# Patient Record
Sex: FEMALE | Race: WHITE | NOT HISPANIC OR LATINO | Employment: OTHER | URBAN - METROPOLITAN AREA
[De-identification: names, ages, dates, MRNs, and addresses within clinical notes are randomized per-mention and may not be internally consistent; named-entity substitution may affect disease eponyms.]

---

## 2020-11-16 ENCOUNTER — OFFICE VISIT (OUTPATIENT)
Dept: NEUROLOGY | Facility: CLINIC | Age: 65
End: 2020-11-16

## 2020-11-16 VITALS
BODY MASS INDEX: 42.21 KG/M2 | OXYGEN SATURATION: 98 % | HEIGHT: 60 IN | HEART RATE: 121 BPM | DIASTOLIC BLOOD PRESSURE: 86 MMHG | SYSTOLIC BLOOD PRESSURE: 144 MMHG | TEMPERATURE: 97.5 F | WEIGHT: 215 LBS

## 2020-11-16 DIAGNOSIS — G93.31 POSTVIRAL FATIGUE SYNDROME: ICD-10-CM

## 2020-11-16 DIAGNOSIS — G43.C0 PERIODIC HEADACHE SYNDROME, NOT INTRACTABLE: Primary | ICD-10-CM

## 2020-11-16 DIAGNOSIS — G25.0 ESSENTIAL TREMOR: ICD-10-CM

## 2020-11-16 PROCEDURE — 99204 OFFICE O/P NEW MOD 45 MIN: CPT | Performed by: PSYCHIATRY & NEUROLOGY

## 2020-11-16 RX ORDER — ROPINIROLE 0.25 MG/1
TABLET, FILM COATED ORAL
COMMUNITY
End: 2021-10-06 | Stop reason: SDUPTHER

## 2020-11-16 RX ORDER — ATORVASTATIN CALCIUM 40 MG/1
40 TABLET, FILM COATED ORAL DAILY
COMMUNITY

## 2020-11-16 RX ORDER — TRIAMTERENE AND HYDROCHLOROTHIAZIDE 37.5; 25 MG/1; MG/1
1 TABLET ORAL DAILY
COMMUNITY
End: 2022-02-28 | Stop reason: SDUPTHER

## 2020-11-16 RX ORDER — DOCUSATE CALCIUM 240 MG
240 CAPSULE ORAL 2 TIMES DAILY
COMMUNITY
End: 2021-09-13

## 2020-11-16 RX ORDER — ONDANSETRON 4 MG/1
4 TABLET, ORALLY DISINTEGRATING ORAL
COMMUNITY
Start: 2020-10-22 | End: 2023-03-15

## 2020-11-16 RX ORDER — TOPIRAMATE 25 MG/1
TABLET ORAL
Qty: 120 TABLET | Refills: 3 | Status: SHIPPED | OUTPATIENT
Start: 2020-11-16 | End: 2021-07-16

## 2020-11-16 RX ORDER — DULOXETIN HYDROCHLORIDE 60 MG/1
60 CAPSULE, DELAYED RELEASE ORAL DAILY
COMMUNITY
End: 2023-03-15 | Stop reason: SDUPTHER

## 2020-11-16 RX ORDER — MECLIZINE HYDROCHLORIDE 25 MG/1
25 TABLET ORAL 3 TIMES DAILY PRN
COMMUNITY
End: 2023-03-15

## 2020-11-16 RX ORDER — LOSARTAN POTASSIUM 100 MG/1
100 TABLET ORAL DAILY
COMMUNITY

## 2020-11-16 NOTE — PROGRESS NOTES
Subjective   Patient ID: Johnna Gonsalves is a 65 y.o. female     Chief Complaint   Patient presents with   • Tremors        History of Present Illness    65 y.o. female referred by Desiree Barr for tremors.  R> L tremors in hands.  Hard to push touch screen on phone.  Sx started in last 6 months.      Back and neck hurt.   Radiates into left side of head.      HA frequency is more days than not.  Mild to moderate intensity.      Holding a microphone or washing dishes.  Drinking or hold a remote.      Legs and feet feel weak.     Writing is messy.      Reviewed medical records:    PMH of Sjogren's dx 5 years ago, T2DM on insulin    Tremor in extremities treated with beta blocker.     No past medical history on file.  No family history on file.  Social History     Socioeconomic History   • Marital status:      Spouse name: Not on file   • Number of children: Not on file   • Years of education: Not on file   • Highest education level: Not on file       Review of Systems   Constitutional: Negative for activity change, fatigue and unexpected weight change.   HENT: Negative for tinnitus and trouble swallowing.    Eyes: Negative for photophobia and visual disturbance.   Respiratory: Negative for apnea, cough and choking.    Cardiovascular: Negative for leg swelling.   Gastrointestinal: Negative for nausea and vomiting.   Endocrine: Negative for cold intolerance and heat intolerance.   Genitourinary: Negative for difficulty urinating, frequency, menstrual problem and urgency.   Musculoskeletal: Negative for back pain, gait problem, myalgias and neck pain.   Skin: Negative for color change and rash.   Allergic/Immunologic: Negative for immunocompromised state.   Neurological: Positive for tremors. Negative for dizziness, seizures, syncope, facial asymmetry, speech difficulty, weakness, light-headedness, numbness and headaches.   Hematological: Negative for adenopathy. Does not bruise/bleed easily.  "  Psychiatric/Behavioral: Negative for behavioral problems, confusion, decreased concentration, hallucinations and sleep disturbance.       Objective     Vitals:    11/16/20 1321   Pulse: (!) 121   Temp: 97.5 °F (36.4 °C)   SpO2: 98%   Weight: 97.5 kg (215 lb)   Height: 152.4 cm (60\")       Neurologic Exam     Mental Status   Oriented to person, place, and time.   Registration: recalls 3 of 3 objects. Recall at 5 minutes: recalls 3 of 3 objects. Follows 3 step commands.   Attention: normal. Concentration: normal.   Speech: speech is normal   Level of consciousness: alert  Knowledge: good and consistent with education.   Able to name object. Able to read. Able to repeat. Able to write. Normal comprehension.     Cranial Nerves     CN II   Visual fields full to confrontation.   Visual acuity: normal  Right visual field deficit: none  Left visual field deficit: none     CN III, IV, VI   Pupils are equal, round, and reactive to light.  Extraocular motions are normal.   Right pupil: Shape: regular. Reactivity: brisk. Consensual response: intact.   Left pupil: Shape: regular. Reactivity: brisk. Consensual response: intact.   Nystagmus: none   Diplopia: none  Ophthalmoparesis: none  Upgaze: normal  Downgaze: normal  Conjugate gaze: present  Vestibulo-ocular reflex: present    CN V   Facial sensation intact.   Right corneal reflex: normal  Left corneal reflex: normal    CN VII   Right facial weakness: none  Left facial weakness: none    CN VIII   Hearing: intact    CN IX, X   Palate: symmetric  Right gag reflex: normal  Left gag reflex: normal    CN XI   Right sternocleidomastoid strength: normal  Left sternocleidomastoid strength: normal    CN XII   Tongue: not atrophic  Fasciculations: absent  Tongue deviation: none    Motor Exam   Muscle bulk: normal  Overall muscle tone: normal  Right arm tone: normal  Left arm tone: normal  Right leg tone: normal  Left leg tone: normal    Strength   Strength 5/5 throughout.     Sensory " Exam   Light touch normal.   Vibration normal.   Proprioception normal.   Pinprick normal.     Gait, Coordination, and Reflexes     Gait  Gait: normal    Coordination   Romberg: negative  Finger to nose coordination: normal  Heel to shin coordination: normal  Tandem walking coordination: normal    Tremor   Resting tremor: absent  Intention tremor: absent  Action tremor: absent    Reflexes   Reflexes 2+ except as noted.       Physical Exam  Vitals signs and nursing note reviewed.   Constitutional:       Appearance: She is well-developed.   HENT:      Head: Normocephalic and atraumatic.   Eyes:      General: Lids are normal.      Extraocular Movements: EOM normal.      Pupils: Pupils are equal, round, and reactive to light.      Funduscopic exam:     Right eye: No hemorrhage, exudate or papilledema.         Left eye: No hemorrhage, exudate or papilledema.   Neck:      Musculoskeletal: Normal range of motion.      Thyroid: No thyroid mass or thyromegaly.      Vascular: Normal carotid pulses. No carotid bruit.      Trachea: Phonation normal.   Cardiovascular:      Rate and Rhythm: Normal rate and regular rhythm.      Heart sounds: Normal heart sounds.   Pulmonary:      Effort: Pulmonary effort is normal.   Skin:     General: Skin is warm and dry.   Neurological:      Mental Status: She is oriented to person, place, and time.      Coordination: Finger-Nose-Finger Test, Heel to Shin Test and Romberg Test normal.      Gait: Gait is intact. Tandem walk normal.      Deep Tendon Reflexes: Strength normal.   Psychiatric:         Speech: Speech normal.         No results found for any previous visit.         Assessment/Plan     Problem List Items Addressed This Visit        Cardiovascular and Mediastinum    Periodic headache syndrome, not intractable - Primary    Current Assessment & Plan     Headaches are newly identified.  Medication changes per orders.     Start TPM titration              Relevant Medications    DULoxetine  (CYMBALTA) 60 MG capsule    topiramate (Topamax) 25 MG tablet    Other Relevant Orders    CBC & Differential    Comprehensive Metabolic Panel    Vitamin B12 & Folate    TSH       Nervous and Auditory    Essential tremor    Current Assessment & Plan     Labs     Start TPM          Relevant Medications    rOPINIRole (Requip) 0.25 MG tablet    topiramate (Topamax) 25 MG tablet      Other Visit Diagnoses     Postviral fatigue syndrome         Relevant Orders    TSH             No follow-ups on file.

## 2021-01-12 PROBLEM — G43.C0 PERIODIC HEADACHE SYNDROME, NOT INTRACTABLE: Chronic | Status: ACTIVE | Noted: 2020-11-16

## 2021-01-12 PROBLEM — G25.0 ESSENTIAL TREMOR: Chronic | Status: ACTIVE | Noted: 2020-11-16

## 2021-07-16 ENCOUNTER — OFFICE VISIT (OUTPATIENT)
Dept: FAMILY MEDICINE CLINIC | Facility: CLINIC | Age: 66
End: 2021-07-16

## 2021-07-16 VITALS
WEIGHT: 204.2 LBS | OXYGEN SATURATION: 99 % | SYSTOLIC BLOOD PRESSURE: 138 MMHG | HEIGHT: 60 IN | BODY MASS INDEX: 40.09 KG/M2 | DIASTOLIC BLOOD PRESSURE: 78 MMHG | HEART RATE: 83 BPM

## 2021-07-16 DIAGNOSIS — E11.65 UNCONTROLLED TYPE 2 DIABETES MELLITUS WITH HYPERGLYCEMIA (HCC): ICD-10-CM

## 2021-07-16 DIAGNOSIS — M79.7 FIBROMYALGIA: ICD-10-CM

## 2021-07-16 DIAGNOSIS — Z76.89 ENCOUNTER TO ESTABLISH CARE: Primary | ICD-10-CM

## 2021-07-16 DIAGNOSIS — E55.9 VITAMIN D DEFICIENCY: ICD-10-CM

## 2021-07-16 DIAGNOSIS — Z11.59 ENCOUNTER FOR HEPATITIS C SCREENING TEST FOR LOW RISK PATIENT: ICD-10-CM

## 2021-07-16 DIAGNOSIS — G89.4 CHRONIC PAIN SYNDROME: ICD-10-CM

## 2021-07-16 DIAGNOSIS — I25.2 HISTORY OF MI (MYOCARDIAL INFARCTION): ICD-10-CM

## 2021-07-16 DIAGNOSIS — E11.43 TYPE 2 DIABETES MELLITUS WITH DIABETIC AUTONOMIC NEUROPATHY, WITH LONG-TERM CURRENT USE OF INSULIN (HCC): ICD-10-CM

## 2021-07-16 DIAGNOSIS — G25.81 RLS (RESTLESS LEGS SYNDROME): ICD-10-CM

## 2021-07-16 DIAGNOSIS — G43.C0 PERIODIC HEADACHE SYNDROME, NOT INTRACTABLE: Chronic | ICD-10-CM

## 2021-07-16 DIAGNOSIS — Z79.4 TYPE 2 DIABETES MELLITUS WITH DIABETIC AUTONOMIC NEUROPATHY, WITH LONG-TERM CURRENT USE OF INSULIN (HCC): ICD-10-CM

## 2021-07-16 DIAGNOSIS — G25.0 ESSENTIAL TREMOR: Chronic | ICD-10-CM

## 2021-07-16 DIAGNOSIS — I10 ESSENTIAL HYPERTENSION: ICD-10-CM

## 2021-07-16 DIAGNOSIS — E66.9 OBESITY (BMI 30-39.9): ICD-10-CM

## 2021-07-16 DIAGNOSIS — R53.83 OTHER FATIGUE: ICD-10-CM

## 2021-07-16 DIAGNOSIS — E78.2 MIXED HYPERLIPIDEMIA: ICD-10-CM

## 2021-07-16 LAB — HBA1C MFR BLD: 10.8 %

## 2021-07-16 PROCEDURE — 99204 OFFICE O/P NEW MOD 45 MIN: CPT | Performed by: PHYSICIAN ASSISTANT

## 2021-07-16 PROCEDURE — 3046F HEMOGLOBIN A1C LEVEL >9.0%: CPT | Performed by: PHYSICIAN ASSISTANT

## 2021-07-16 PROCEDURE — 83036 HEMOGLOBIN GLYCOSYLATED A1C: CPT | Performed by: PHYSICIAN ASSISTANT

## 2021-07-16 RX ORDER — SALIVA STIMULANT COMB. NO.7
GEL (GRAM) MUCOUS MEMBRANE AS NEEDED
COMMUNITY
End: 2023-02-02

## 2021-07-16 RX ORDER — OXYMETAZOLINE HYDROCHLORIDE 0.05 G/100ML
2 SPRAY NASAL 2 TIMES DAILY
COMMUNITY
End: 2023-02-02

## 2021-07-16 RX ORDER — GLIMEPIRIDE 4 MG/1
TABLET ORAL
COMMUNITY
Start: 2021-07-14 | End: 2022-02-15 | Stop reason: SDUPTHER

## 2021-07-16 RX ORDER — ASPIRIN 81 MG/1
81 TABLET, CHEWABLE ORAL DAILY
COMMUNITY

## 2021-07-16 RX ORDER — INSULIN ASPART 100 [IU]/ML
100 INJECTION, SUSPENSION SUBCUTANEOUS 2 TIMES DAILY WITH MEALS
COMMUNITY
End: 2022-02-07

## 2021-07-16 RX ORDER — BISOPROLOL FUMARATE 10 MG/1
TABLET, FILM COATED ORAL
COMMUNITY
Start: 2021-07-05 | End: 2022-02-28 | Stop reason: SDUPTHER

## 2021-07-16 RX ORDER — EMPAGLIFLOZIN 25 MG/1
TABLET, FILM COATED ORAL
COMMUNITY
Start: 2021-05-10 | End: 2022-02-07 | Stop reason: SDUPTHER

## 2021-07-17 NOTE — PROGRESS NOTES
Chief Complaint   Patient presents with   • Establish Care     Pt is here to Establish care.        HPI     Johnna Gonsalves is a 66 y.o. female who presents to establish care.  Patient was recently established with a primary care provider.  She states that she has not had blood work or annual exam in the last year.  She presents for management of obesity, diabetes type 2, history of MI, hyperlipidemia, hypertension, chronic pain syndrome, fibromyalgia, fatigue, RLS, headache and essential tremor.      Patient is established with cardiology.  On ASA 81 mg daily. Blood pressure is borderline today.  She monitors at home and states this is usually well-controlled.    Patient reports that her recent hemoglobin AIC was 12%.  She refuses to take metformin, sister had adverse effects with medication.  She is on jardiance 25 mg daily, glimepiride 4 mg daily, novolog 70/30 BID.  She is interested in diabetic education.    She is taking ropinirole 0.75 mg nighty for RLS and this works well.  No longer taking topamax for headache as this was not helping.    On duloxetine 60 mg daily for fibromyalgia and chronic pain. She does not feel this is helping her symptoms.  She has ongoing chronic pain throughout her body.  She also reports ongoing fatigue.    Does not want Covid-19 vaccination until it receives full FDA approval.    Chief Complaint   Patient presents with   • Establish Care     Pt is here to Establish care.        Past Medical History:   Diagnosis Date   • Anemia    • Arthritis    • Carpal tunnel syndrome    • Cataract    • Chronic tension headaches    • Diabetes (CMS/HCC)    • Diabetic neuropathy (CMS/HCC)    • Dizziness    • Environmental allergies    • Fibromyalgia    • Fibromyalgia    • Fractures    • Gall stones    • Heart attack (CMS/HCC)    • Hypertension    • Kidney infection    • Migraine headache    • Numbness and tingling    • Ovarian cyst    • Shingles    • Sleep apnea        Past Surgical History:    Procedure Laterality Date   • BIOPSY OF LEG     • CATARACT EXTRACTION     •  SECTION     • CHEST WALL BIOPSY     • CHOLECYSTECTOMY     • HYSTERECTOMY         Family History   Problem Relation Age of Onset   • Diabetes Mother    • Dementia Mother    • Heart disease Mother    • Hypertension Mother    • Hyperlipidemia Mother    • Kidney disease Mother    • Neuropathy Mother    • Obesity Mother    • Thyroid disease Mother    • Heart attack Mother    • Heart disease Father    • Lung cancer Father         smoker   • Heart attack Father    • Cancer Sister         bile duct   • Obesity Sister    • Breast cancer Maternal Aunt    • Lung cancer Maternal Aunt         smoker   • Colon cancer Neg Hx        Social History     Socioeconomic History   • Marital status:      Spouse name: Not on file   • Number of children: Not on file   • Years of education: Not on file   • Highest education level: Not on file   Tobacco Use   • Smoking status: Never Smoker   • Smokeless tobacco: Never Used   Substance and Sexual Activity   • Alcohol use: Never   • Drug use: Never   • Sexual activity: Defer       Allergies   Allergen Reactions   • Sulfa Antibiotics Swelling       ROS    Review of Systems   Constitutional: Positive for fatigue and unexpected weight loss. Negative for activity change, appetite change, chills, diaphoresis, fever and unexpected weight gain.   HENT: Positive for trouble swallowing. Negative for congestion, dental problem, ear pain, hearing loss, nosebleeds, sinus pressure and sore throat.    Eyes: Positive for pain, discharge, redness, itching and visual disturbance. Negative for blurred vision.   Respiratory: Positive for shortness of breath (with exertion). Negative for apnea, cough, chest tightness and wheezing.    Cardiovascular: Positive for leg swelling. Negative for chest pain and palpitations.   Gastrointestinal: Negative for abdominal distention, abdominal pain, anal bleeding, blood in stool,  "constipation, diarrhea, nausea, vomiting, GERD and indigestion.   Endocrine: Positive for heat intolerance and polyphagia. Negative for cold intolerance, polydipsia and polyuria.   Genitourinary: Negative for decreased urine volume, difficulty urinating, dysuria, frequency, hematuria, urgency and urinary incontinence.   Musculoskeletal: Positive for arthralgias, back pain, joint swelling and myalgias. Negative for gait problem and bursitis.   Skin: Negative for dry skin, rash, skin lesions and bruise.        Itching     Neurological: Positive for dizziness, weakness, light-headedness, numbness and headache. Negative for tremors, seizures, syncope, speech difficulty, memory problem and confusion.   Hematological: Bruises/bleeds easily.   Psychiatric/Behavioral: Positive for agitation and depressed mood. Negative for behavioral problems, decreased concentration, hallucinations, sleep disturbance, suicidal ideas and stress. The patient is nervous/anxious.        Vitals:    07/16/21 1015   BP: 138/78   Pulse: 83   SpO2: 99%   Weight: 92.6 kg (204 lb 3.2 oz)   Height: 152.4 cm (60\")   PainSc:   4     Body mass index is 39.88 kg/m².    Current Outpatient Medications on File Prior to Visit   Medication Sig Dispense Refill   • aspirin 81 MG chewable tablet Chew 81 mg Daily.     • atorvastatin (LIPITOR) 40 MG tablet Take 40 mg by mouth Daily.     • bisoprolol (ZEBeta) 10 MG tablet      • Cyanocobalamin (Vitamin B-12) 5000 MCG sublingual tablet Place  under the tongue.     • dry mouth gel (BIOTENE ORALBALANCE) gel Apply  to the mouth or throat As Needed for Dry Mouth.     • DULoxetine (CYMBALTA) 60 MG capsule Take 60 mg by mouth Daily.     • Folic Acid (FOLATE PO) Take  by mouth.     • glimepiride (AMARYL) 4 MG tablet      • insulin aspart prot-insulin aspart (novoLOG 70/30) (70-30) 100 UNIT/ML injection Inject 100 Units under the skin into the appropriate area as directed 2 (Two) Times a Day With Meals.     • Jardiance 25 MG " tablet tablet      • losartan (COZAAR) 100 MG tablet Take 100 mg by mouth Daily.     • meclizine (ANTIVERT) 25 MG tablet Take 25 mg by mouth 3 (Three) Times a Day As Needed for Dizziness.     • ondansetron ODT (ZOFRAN-ODT) 4 MG disintegrating tablet 4 mg.     • oxymetazoline (AFRIN) 0.05 % nasal spray 2 sprays into the nostril(s) as directed by provider 2 (Two) Times a Day.     • rOPINIRole (Requip) 0.25 MG tablet Every Night At Bedtime     • triamterene-hydrochlorothiazide (MAXZIDE-25) 37.5-25 MG per tablet Take 1 tablet by mouth Daily.     • Cholecalciferol (vitamin D3) 125 MCG (5000 UT) capsule capsule Take 5,000 Units by mouth Daily.     • docusate calcium (SURFAK) 240 MG capsule Take 240 mg by mouth 2 (Two) Times a Day.     • [DISCONTINUED] topiramate (Topamax) 25 MG tablet Take one tablet twice a day for one week, then two tablets twice a day 120 tablet 3     No current facility-administered medications on file prior to visit.       Results for orders placed or performed in visit on 07/16/21   POC Glycosylated Hemoglobin (Hb A1C)    Specimen: Blood   Result Value Ref Range    Hemoglobin A1C 10.8 %       PE  Physical Exam  Vitals reviewed.   Constitutional:       General: She is not in acute distress.     Appearance: Normal appearance. She is well-developed. She is obese. She is not ill-appearing or diaphoretic.   HENT:      Head: Normocephalic and atraumatic.   Eyes:      Extraocular Movements: Extraocular movements intact.      Conjunctiva/sclera: Conjunctivae normal.   Cardiovascular:      Rate and Rhythm: Normal rate and regular rhythm.      Heart sounds: Normal heart sounds.   Pulmonary:      Effort: Pulmonary effort is normal.      Breath sounds: Normal breath sounds.   Musculoskeletal:         General: Normal range of motion.      Cervical back: Normal range of motion.      Right lower leg: No edema.      Left lower leg: No edema.   Skin:     General: Skin is warm.      Findings: No erythema or rash.    Neurological:      General: No focal deficit present.      Mental Status: She is alert.   Psychiatric:         Attention and Perception: Attention and perception normal. She is attentive.         Mood and Affect: Mood and affect normal.         Speech: Speech normal.         Behavior: Behavior normal. Behavior is cooperative.         Thought Content: Thought content normal.         Cognition and Memory: Cognition and memory normal.         Judgment: Judgment normal.         A/P    Diagnoses and all orders for this visit:    1. Encounter to establish care (Primary)    2. Obesity (BMI 30-39.9)    3. Uncontrolled type 2 diabetes mellitus with hyperglycemia (CMS/[facility])  4. Type 2 diabetes mellitus with diabetic autonomic neuropathy, with long-term current use of insulin (CMS/[facility])  -     POC Glycosylated Hemoglobin (Hb A1C)  -     Vitamin B12; Future  -     Iron Profile; Future  -     Microalbumin / Creatinine Urine Ratio - Urine, Clean Catch; Future  -     Ambulatory Referral to Diabetic Education  Patient reports recent hemoglobin AIC was 12%.  Today hemoglobin AIC is 10.8%.  She is interested in diabetic education, referral placed.  Does not want to take metformin.  On jardiance, novolog 70/30 and glimiperide.    5. History of MI (myocardial infarction)  On ASA 81 mg daily.    6. Essential hypertension  -     CBC Auto Differential; Future  -     Comprehensive Metabolic Panel; Future  -     TSH Rfx On Abnormal To Free T4; Future  Borderline today at appointment.  Patient monitors at home and it is usually better controlled.  Patient is followed by cardiology.  She is compliant on all medications.    7. Mixed hyperlipidemia  -     Lipid Panel; Future  On atorvastatin 40 mg daily.    8. Chronic pain syndrome  9. Fibromyalgia  On duloxetine 60 mg daily.  Patient does not think this is helpful.    10. Other fatigue  -     Vitamin B12; Future  -     Iron Profile; Future    11. RLS (restless legs syndrome)  On ropinirole  0.75 mg nightly.  This is working well for restless legs.    12. Periodic headache syndrome, not intractable  Ongoing issue.  Failed topamax.    13. Essential tremor  Not on any medication for this.  Is on bisoprolol for HTN.    14. Vitamin D deficiency  -     Vitamin D 25 Hydroxy; Future    15. Encounter for hepatitis C screening test for low risk patient  -     Hepatitis C Antibody; Future    Patient has multiple health issues.  Will order labs and follow-up with medicare wellness.  She denies needing refills on medications today.    Plan of care reviewed with patient at the conclusion of today's visit. Education was provided regarding diagnosis, management and any prescribed or recommended OTC medications.  Patient verbalizes understanding of and agreement with management plan.    Return in about 4 weeks (around 8/13/2021) for Medicare Wellness.     Tracee Gaona PA-C

## 2021-07-28 ENCOUNTER — LAB (OUTPATIENT)
Dept: LAB | Facility: HOSPITAL | Age: 66
End: 2021-07-28

## 2021-07-28 DIAGNOSIS — R53.83 OTHER FATIGUE: ICD-10-CM

## 2021-07-28 DIAGNOSIS — E55.9 VITAMIN D DEFICIENCY: ICD-10-CM

## 2021-07-28 DIAGNOSIS — E11.43 TYPE 2 DIABETES MELLITUS WITH DIABETIC AUTONOMIC NEUROPATHY, WITH LONG-TERM CURRENT USE OF INSULIN (HCC): ICD-10-CM

## 2021-07-28 DIAGNOSIS — Z79.4 TYPE 2 DIABETES MELLITUS WITH DIABETIC AUTONOMIC NEUROPATHY, WITH LONG-TERM CURRENT USE OF INSULIN (HCC): ICD-10-CM

## 2021-07-28 DIAGNOSIS — I10 ESSENTIAL HYPERTENSION: ICD-10-CM

## 2021-07-28 DIAGNOSIS — E78.2 MIXED HYPERLIPIDEMIA: ICD-10-CM

## 2021-07-29 ENCOUNTER — APPOINTMENT (OUTPATIENT)
Dept: DIABETES SERVICES | Facility: HOSPITAL | Age: 66
End: 2021-07-29

## 2021-07-29 LAB
25(OH)D3+25(OH)D2 SERPL-MCNC: 35.8 NG/ML (ref 30–100)
ALBUMIN SERPL-MCNC: 4.3 G/DL (ref 3.5–5.2)
ALBUMIN/CREAT UR: 5 MG/G CREAT (ref 0–29)
ALBUMIN/GLOB SERPL: 1.5 G/DL
ALP SERPL-CCNC: 105 U/L (ref 39–117)
ALT SERPL-CCNC: 19 U/L (ref 1–33)
AST SERPL-CCNC: 21 U/L (ref 1–32)
BASOPHILS # BLD AUTO: 0.05 10*3/MM3 (ref 0–0.2)
BASOPHILS NFR BLD AUTO: 0.6 % (ref 0–1.5)
BILIRUB SERPL-MCNC: 0.9 MG/DL (ref 0–1.2)
BUN SERPL-MCNC: 13 MG/DL (ref 8–23)
BUN/CREAT SERPL: 17.3 (ref 7–25)
CALCIUM SERPL-MCNC: 9.1 MG/DL (ref 8.6–10.5)
CHLORIDE SERPL-SCNC: 104 MMOL/L (ref 98–107)
CHOLEST SERPL-MCNC: 130 MG/DL (ref 0–200)
CO2 SERPL-SCNC: 26.6 MMOL/L (ref 22–29)
CREAT SERPL-MCNC: 0.75 MG/DL (ref 0.57–1)
CREAT UR-MCNC: 91 MG/DL
EOSINOPHIL # BLD AUTO: 0.1 10*3/MM3 (ref 0–0.4)
EOSINOPHIL NFR BLD AUTO: 1.2 % (ref 0.3–6.2)
ERYTHROCYTE [DISTWIDTH] IN BLOOD BY AUTOMATED COUNT: 12.9 % (ref 12.3–15.4)
GLOBULIN SER CALC-MCNC: 2.9 GM/DL
GLUCOSE SERPL-MCNC: 192 MG/DL (ref 65–99)
HCT VFR BLD AUTO: 44.8 % (ref 34–46.6)
HDLC SERPL-MCNC: 44 MG/DL (ref 40–60)
HGB BLD-MCNC: 15.3 G/DL (ref 12–15.9)
IMM GRANULOCYTES # BLD AUTO: 0.01 10*3/MM3 (ref 0–0.05)
IMM GRANULOCYTES NFR BLD AUTO: 0.1 % (ref 0–0.5)
IRON SATN MFR SERPL: 28 % (ref 20–50)
IRON SERPL-MCNC: 96 MCG/DL (ref 37–145)
LDLC SERPL CALC-MCNC: 61 MG/DL (ref 0–100)
LYMPHOCYTES # BLD AUTO: 2.36 10*3/MM3 (ref 0.7–3.1)
LYMPHOCYTES NFR BLD AUTO: 28.1 % (ref 19.6–45.3)
MCH RBC QN AUTO: 31.2 PG (ref 26.6–33)
MCHC RBC AUTO-ENTMCNC: 34.2 G/DL (ref 31.5–35.7)
MCV RBC AUTO: 91.4 FL (ref 79–97)
MICROALBUMIN UR-MCNC: 4.8 UG/ML
MONOCYTES # BLD AUTO: 0.5 10*3/MM3 (ref 0.1–0.9)
MONOCYTES NFR BLD AUTO: 6 % (ref 5–12)
NEUTROPHILS # BLD AUTO: 5.37 10*3/MM3 (ref 1.7–7)
NEUTROPHILS NFR BLD AUTO: 64 % (ref 42.7–76)
NRBC BLD AUTO-RTO: 0 /100 WBC (ref 0–0.2)
PLATELET # BLD AUTO: 263 10*3/MM3 (ref 140–450)
POTASSIUM SERPL-SCNC: 3.6 MMOL/L (ref 3.5–5.2)
PROT SERPL-MCNC: 7.2 G/DL (ref 6–8.5)
RBC # BLD AUTO: 4.9 10*6/MM3 (ref 3.77–5.28)
SODIUM SERPL-SCNC: 141 MMOL/L (ref 136–145)
TIBC SERPL-MCNC: 345 MCG/DL
TRIGL SERPL-MCNC: 146 MG/DL (ref 0–150)
TSH SERPL DL<=0.005 MIU/L-ACNC: 1.62 UIU/ML (ref 0.27–4.2)
UIBC SERPL-MCNC: 249 MCG/DL (ref 112–346)
VIT B12 SERPL-MCNC: 725 PG/ML (ref 211–946)
VLDLC SERPL CALC-MCNC: 25 MG/DL (ref 5–40)
WBC # BLD AUTO: 8.39 10*3/MM3 (ref 3.4–10.8)

## 2021-08-10 ENCOUNTER — HOSPITAL ENCOUNTER (OUTPATIENT)
Dept: DIABETES SERVICES | Facility: HOSPITAL | Age: 66
Setting detail: RECURRING SERIES
Discharge: HOME | End: 2021-08-10

## 2021-08-10 NOTE — CONSULTS
DIABETES EDUCATION CONSULT, 90 minutes diabetes education.  This medical referred consult was provided as a telephone call, tele-health or e-visit, as patient is unable to attend an in-office appointment due to the COVID-19 crisis. Consent for treatment was given verbally.Please see media tab for assessment and notes if you use [employer]. If you are not an [employer] user a copy of patient's assessment and notes will be sent per routine. Thank you.

## 2021-09-07 ENCOUNTER — HOSPITAL ENCOUNTER (OUTPATIENT)
Dept: DIABETES SERVICES | Facility: HOSPITAL | Age: 66
Setting detail: RECURRING SERIES
Discharge: HOME | End: 2021-09-07

## 2021-09-07 ENCOUNTER — DOCUMENTATION (OUTPATIENT)
Dept: DIABETES SERVICES | Facility: HOSPITAL | Age: 66
End: 2021-09-07

## 2021-09-07 NOTE — PLAN OF CARE
DIABETES EDUCATION CONSULT, 30 minutes diabetes education follow up. This medical referred consult was provided as a telephone call, tele-health or e-visit, as patient is unable to attend an in-office appointment due to the COVID-19 crisis. Consent for treatment was given verbally.Please see media tab for assessment and notes if you use [facility]. If you are not an [employer] user a copy of patient's assessment and notes will be sent per routine. Thank you.

## 2021-09-13 ENCOUNTER — OFFICE VISIT (OUTPATIENT)
Dept: FAMILY MEDICINE CLINIC | Facility: CLINIC | Age: 66
End: 2021-09-13

## 2021-09-13 VITALS
WEIGHT: 209.8 LBS | BODY MASS INDEX: 41.19 KG/M2 | SYSTOLIC BLOOD PRESSURE: 138 MMHG | OXYGEN SATURATION: 98 % | DIASTOLIC BLOOD PRESSURE: 76 MMHG | HEART RATE: 84 BPM | HEIGHT: 60 IN

## 2021-09-13 DIAGNOSIS — Z12.11 SCREEN FOR COLON CANCER: ICD-10-CM

## 2021-09-13 DIAGNOSIS — E11.43 TYPE 2 DIABETES MELLITUS WITH DIABETIC AUTONOMIC NEUROPATHY, WITH LONG-TERM CURRENT USE OF INSULIN (HCC): ICD-10-CM

## 2021-09-13 DIAGNOSIS — R45.4 IRRITABILITY: ICD-10-CM

## 2021-09-13 DIAGNOSIS — Z00.00 MEDICARE ANNUAL WELLNESS VISIT, SUBSEQUENT: Primary | ICD-10-CM

## 2021-09-13 DIAGNOSIS — R10.11 RIGHT UPPER QUADRANT PAIN: ICD-10-CM

## 2021-09-13 DIAGNOSIS — I25.2 HISTORY OF MI (MYOCARDIAL INFARCTION): ICD-10-CM

## 2021-09-13 DIAGNOSIS — Z79.4 TYPE 2 DIABETES MELLITUS WITH DIABETIC AUTONOMIC NEUROPATHY, WITH LONG-TERM CURRENT USE OF INSULIN (HCC): ICD-10-CM

## 2021-09-13 DIAGNOSIS — Z12.31 ENCOUNTER FOR SCREENING MAMMOGRAM FOR MALIGNANT NEOPLASM OF BREAST: ICD-10-CM

## 2021-09-13 DIAGNOSIS — M35.01 SJOGREN'S SYNDROME WITH KERATOCONJUNCTIVITIS SICCA (HCC): ICD-10-CM

## 2021-09-13 DIAGNOSIS — I10 ESSENTIAL HYPERTENSION: ICD-10-CM

## 2021-09-13 DIAGNOSIS — Z23 NEED FOR INFLUENZA VACCINATION: ICD-10-CM

## 2021-09-13 DIAGNOSIS — E78.2 MIXED HYPERLIPIDEMIA: ICD-10-CM

## 2021-09-13 DIAGNOSIS — S30.1XXA CONTUSION OF ABDOMINAL WALL, INITIAL ENCOUNTER: ICD-10-CM

## 2021-09-13 DIAGNOSIS — M85.88 OTHER SPECIFIED DISORDERS OF BONE DENSITY AND STRUCTURE, OTHER SITE: ICD-10-CM

## 2021-09-13 PROBLEM — M15.9 OSTEOARTHRITIS OF MULTIPLE JOINTS: Status: ACTIVE | Noted: 2021-09-13

## 2021-09-13 LAB — HBA1C MFR BLD: 9.4 %

## 2021-09-13 PROCEDURE — G0008 ADMIN INFLUENZA VIRUS VAC: HCPCS | Performed by: PHYSICIAN ASSISTANT

## 2021-09-13 PROCEDURE — 90686 IIV4 VACC NO PRSV 0.5 ML IM: CPT | Performed by: PHYSICIAN ASSISTANT

## 2021-09-13 PROCEDURE — G0439 PPPS, SUBSEQ VISIT: HCPCS | Performed by: PHYSICIAN ASSISTANT

## 2021-09-13 RX ORDER — HYDROXYCHLOROQUINE SULFATE 200 MG/1
200 TABLET, FILM COATED ORAL DAILY
COMMUNITY
Start: 2021-07-19 | End: 2023-03-15

## 2021-09-13 RX ORDER — ESCITALOPRAM OXALATE 10 MG/1
10 TABLET ORAL DAILY
Qty: 30 TABLET | Refills: 5 | Status: SHIPPED | OUTPATIENT
Start: 2021-09-13 | End: 2022-02-15 | Stop reason: SDUPTHER

## 2021-09-13 RX ORDER — ISOSORBIDE DINITRATE 30 MG/1
30 TABLET ORAL
COMMUNITY
Start: 2021-07-26 | End: 2023-02-02

## 2021-09-13 NOTE — PROGRESS NOTES
The ABCs of the Annual Wellness Visit  Subsequent Medicare Wellness Visit    Chief Complaint   Patient presents with   • Medicare Wellness-subsequent      Subjective    History of Present Illness:  Johnna Gonsalves is a 66 y.o. female who presents for a Subsequent Medicare Wellness Visit.  Patient presents for hypertension, hyperlipidemia, history of MI, diabetes type 2, Sjogren's syndrome, osteoarthritis and new onset irritability, RUQ pain with bruising.  Patient is compliant on all medications.  Followed by rheumatology, cardiology and ophthalmology.  Due for mammogram, DEXA and colonoscopy.  Trying to walk daily and watching her diet.  Takes ASA daily.  Had first Covid-19 vaccination.    Patient reports new onset irritability.  Reports this is 20 year anniversary of  passing and that has been very hard.  Takes cymbalta 60 mg daily for fibromyalgia.  Not on SSRI, hasn't tried this in the past.    Patient reports right upper quadrant pain that is sharp at times and dull ache otherwise.  Noticed bruising in this area.  Denies any trauma/injury.  Had gallbladder removed years ago.  Sister  from biliary/bile duct carcinoma years after her gallbladder was removed.  Patient reports mild nausea.  No change in stools, vomiting, fever or chills.    The following portions of the patient's history were reviewed and   updated as appropriate: allergies, current medications, past family history, past medical history, past social history, past surgical history and problem list.    Compared to one year ago, the patient feels her physical   health is the same.    Compared to one year ago, the patient feels her mental   health is worse.    Recent Hospitalizations:  This patient has had a [facility] admission record on file within the last 365 days.    Current Medical Providers:  Patient Care Team:  Tracee Gaona PA-C as PCP - General (Physician Assistant)  Danya Kyle MD as Consulting Physician  (Cardiology)  Og Mclean MD as Consulting Physician (Rheumatology)    Outpatient Medications Prior to Visit   Medication Sig Dispense Refill   • aspirin 81 MG chewable tablet Chew 81 mg Daily.     • atorvastatin (LIPITOR) 40 MG tablet Take 40 mg by mouth Daily.     • bisoprolol (ZEBeta) 10 MG tablet      • Cholecalciferol (vitamin D3) 125 MCG (5000 UT) capsule capsule Take 5,000 Units by mouth Daily.     • Cyanocobalamin (Vitamin B-12) 5000 MCG sublingual tablet Place  under the tongue.     • dry mouth gel (BIOTENE ORALBALANCE) gel Apply  to the mouth or throat As Needed for Dry Mouth.     • DULoxetine (CYMBALTA) 60 MG capsule Take 60 mg by mouth Daily.     • Folic Acid (FOLATE PO) Take  by mouth.     • glimepiride (AMARYL) 4 MG tablet      • hydroxychloroquine (PLAQUENIL) 200 MG tablet Take 200 mg by mouth Daily.     • insulin aspart prot-insulin aspart (novoLOG 70/30) (70-30) 100 UNIT/ML injection Inject 100 Units under the skin into the appropriate area as directed 2 (Two) Times a Day With Meals.     • isosorbide dinitrate (ISORDIL) 30 MG tablet      • Jardiance 25 MG tablet tablet      • losartan (COZAAR) 100 MG tablet Take 100 mg by mouth Daily.     • meclizine (ANTIVERT) 25 MG tablet Take 25 mg by mouth 3 (Three) Times a Day As Needed for Dizziness.     • ondansetron ODT (ZOFRAN-ODT) 4 MG disintegrating tablet 4 mg.     • oxymetazoline (AFRIN) 0.05 % nasal spray 2 sprays into the nostril(s) as directed by provider 2 (Two) Times a Day.     • rOPINIRole (Requip) 0.25 MG tablet Every Night At Bedtime     • triamterene-hydrochlorothiazide (MAXZIDE-25) 37.5-25 MG per tablet Take 1 tablet by mouth Daily.     • docusate calcium (SURFAK) 240 MG capsule Take 240 mg by mouth 2 (Two) Times a Day.       No facility-administered medications prior to visit.       No opioid medication identified on active medication list. I have reviewed chart for other potential  high risk medication/s and harmful drug interactions in  the elderly.          Aspirin is on active medication list. Aspirin use is indicated based on review of current medical condition/s. Pros and cons of this therapy have been discussed today. Benefits of this medication outweigh potential harm.  Patient has been encouraged to continue taking this medication.  .      Patient Active Problem List   Diagnosis   • Periodic headache syndrome, not intractable   • Essential tremor   • Fibromyalgia   • Chronic pain syndrome   • Type 2 diabetes mellitus with diabetic autonomic neuropathy, with long-term current use of insulin (CMS/[facility])   • RLS (restless legs syndrome)   • Essential hypertension   • History of MI (myocardial infarction)   • Mixed hyperlipidemia   • Obesity (BMI 30-39.9)   • Uncontrolled type 2 diabetes mellitus with hyperglycemia (CMS/[facility])   • Sjogren's syndrome with keratoconjunctivitis sicca (CMS/[facility])   • Osteoarthritis of multiple joints     Advance Care Planning  Advance Directive is not on file.  ACP discussion was held with the patient during this visit. Patient has an advance directive (not in EMR), copy requested.    Review of Systems   Constitutional: Negative for chills, fatigue and fever.   HENT: Negative for congestion, rhinorrhea, sinus pressure and sore throat.    Eyes: Positive for visual disturbance.   Respiratory: Negative for cough, shortness of breath and wheezing.    Cardiovascular: Negative for chest pain, palpitations and leg swelling.   Gastrointestinal: Positive for abdominal distention, abdominal pain and nausea. Negative for blood in stool, constipation, diarrhea and vomiting.   Endocrine: Negative for polydipsia, polyphagia and polyuria.   Genitourinary: Negative for dysuria, flank pain and hematuria.   Musculoskeletal: Positive for arthralgias and myalgias. Negative for gait problem.   Neurological: Negative for dizziness.   Hematological: Bruises/bleeds easily.   Psychiatric/Behavioral: Positive for agitation. Negative for dysphoric  "mood, sleep disturbance and suicidal ideas.        Objective    Vitals:    09/13/21 1002   BP: 138/76   Pulse: 84   SpO2: 98%   Weight: 95.2 kg (209 lb 12.8 oz)   Height: 152.4 cm (60\")     BMI Readings from Last 1 Encounters:   09/13/21 40.97 kg/m²   BMI is above normal parameters. Recommendations include: exercise counseling and nutrition counseling    Does the patient have evidence of cognitive impairment? No    Physical Exam  Vitals reviewed.   Constitutional:       General: She is not in acute distress.     Appearance: Normal appearance. She is well-developed. She is obese. She is not ill-appearing or diaphoretic.   HENT:      Head: Normocephalic and atraumatic.      Right Ear: Hearing normal.      Left Ear: Hearing normal.   Eyes:      General: Lids are normal.      Extraocular Movements: Extraocular movements intact.      Conjunctiva/sclera: Conjunctivae normal.   Neck:      Thyroid: No thyroid mass or thyromegaly.      Trachea: Trachea and phonation normal.   Cardiovascular:      Rate and Rhythm: Normal rate and regular rhythm.      Heart sounds: Normal heart sounds.   Pulmonary:      Effort: Pulmonary effort is normal.      Breath sounds: Normal breath sounds.   Abdominal:      General: Abdomen is protuberant. There is no distension.      Palpations: Abdomen is soft. Abdomen is not rigid.      Tenderness: There is abdominal tenderness in the right upper quadrant and epigastric area. There is no right CVA tenderness, left CVA tenderness or guarding.       Musculoskeletal:         General: Normal range of motion.      Cervical back: Normal range of motion.      Right lower leg: No edema.      Left lower leg: No edema.   Lymphadenopathy:      Cervical: No cervical adenopathy.      Right cervical: No superficial cervical adenopathy.     Left cervical: No superficial cervical adenopathy.   Skin:     General: Skin is warm.      Findings: No erythema or rash.      Nails: There is no clubbing.   Neurological:      " Mental Status: She is alert and oriented to person, place, and time.      Coordination: Coordination normal.      Gait: Gait normal.      Deep Tendon Reflexes: Reflexes are normal and symmetric.      Comments: CN grossly intact   Psychiatric:         Attention and Perception: Attention and perception normal. She is attentive.         Mood and Affect: Mood and affect normal.         Speech: Speech normal.         Behavior: Behavior normal. Behavior is cooperative.         Thought Content: Thought content normal.         Cognition and Memory: Cognition and memory normal.         Judgment: Judgment normal.       Lab Results   Component Value Date     (H) 07/28/2021    CHLPL 130 07/28/2021    TRIG 146 07/28/2021    HDL 44 07/28/2021    LDL 61 07/28/2021    VLDL 25 07/28/2021    HGBA1C 9.4 09/13/2021            HEALTH RISK ASSESSMENT    Smoking Status:  Social History     Tobacco Use   Smoking Status Never Smoker   Smokeless Tobacco Never Used     Alcohol Consumption:  Social History     Substance and Sexual Activity   Alcohol Use Never     Fall Risk Screen:    STEADI Fall Risk Assessment has not been completed.    Depression Screening:  PHQ-2/PHQ-9 Depression Screening 9/13/2021   Little interest or pleasure in doing things 0   Feeling down, depressed, or hopeless 0   Total Score 0       Health Habits and Functional and Cognitive Screening:  Functional & Cognitive Status 9/13/2021   Do you have difficulty preparing food and eating? No   Do you have difficulty bathing yourself, getting dressed or grooming yourself? No   Do you have difficulty using the toilet? No   Do you have difficulty moving around from place to place? No   Do you have trouble with steps or getting out of a bed or a chair? No   Current Diet Well Balanced Diet   Dental Exam Not up to date   Eye Exam Up to date   Exercise (times per week) 1 times per week   Current Exercises Include Walking   Do you need help using the phone?  No   Are you deaf  or do you have serious difficulty hearing?  No   Do you need help with transportation? No   Do you need help shopping? No   Do you need help preparing meals?  No   Do you need help with housework?  No   Do you need help with laundry? No   Do you need help taking your medications? No   Do you need help managing money? No   Do you ever drive or ride in a car without wearing a seat belt? No   Have you felt unusual stress, anger or loneliness in the last month? No   Who do you live with? Other   If you need help, do you have trouble finding someone available to you? No   Have you been bothered in the last four weeks by sexual problems? No   Do you have difficulty concentrating, remembering or making decisions? Yes       Age-appropriate Screening Schedule:  Refer to the list below for future screening recommendations based on patient's age, sex and/or medical conditions. Orders for these recommended tests are listed in the plan section. The patient has been provided with a written plan.    Health Maintenance   Topic Date Due   • MAMMOGRAM  Never done   • TDAP/TD VACCINES (1 - Tdap) Never done   • ZOSTER VACCINE (1 of 2) Never done   • DIABETIC FOOT EXAM  Never done   • DIABETIC EYE EXAM  Never done   • INFLUENZA VACCINE  10/01/2021   • HEMOGLOBIN A1C  03/13/2022   • LIPID PANEL  07/28/2022   • URINE MICROALBUMIN  07/28/2022   • PAP SMEAR  Discontinued              Assessment/Plan   CMS Preventative Services Quick Reference  Risk Factors Identified During Encounter  Cardiovascular Disease  Chronic Pain   Immunizations Discussed/Encouraged (specific Immunizations; Influenza and COVID19  Obesity/Overweight   The above risks/problems have been discussed with the patient.  Follow up actions/plans if indicated are seen below in the Assessment/Plan Section.  Pertinent information has been shared with the patient in the After Visit Summary.    Diagnoses and all orders for this visit:    1. Medicare annual wellness visit,  subsequent (Primary)    2. Type 2 diabetes mellitus with diabetic autonomic neuropathy, with long-term current use of insulin (CMS/[facility])  -     POC Glycosylated Hemoglobin (Hb A1C)  Hemoglobin AIC was 10.8%, today it is 9.4%.  Patient is compliant on medications.  Trying to watch diet and eat healthier.  On statin and ARB.    3. Essential hypertension  Borderline.  Encouraged patient to monitor at home.  No headache or dizziness.  Compliant on medications.    4. Mixed hyperlipidemia  On atorvasatin 40 mg daily.    5. History of MI (myocardial infarction)  On ASA daily.  Followed by cardiology.    6. Sjogren's syndrome with keratoconjunctivitis sicca (CMS/HCC)  Established with rheumatology.    7. Irritability  -     escitalopram (Lexapro) 10 MG tablet; Take 1 tablet by mouth Daily.  Dispense: 30 tablet; Refill: 5  Trial of lexapro 10 mg daily.    8. Contusion of abdominal wall, initial encounter  -     CT Abdomen Pelvis With Contrast; Future    9. Right upper quadrant pain  -     CT Abdomen Pelvis With Contrast; Future  Pain and bruising along right upper quadrant.  No other symptoms.  No known trauma/injury.  Cholecystectomy.  Sister  of bile cancer.  Will order CT abdo/pelvis.    10. Other specified disorders of bone density and structure, other site  -     DEXA Bone Density Axial    11. Screen for colon cancer  -     Ambulatory Referral For Screening Colonoscopy    12. Encounter for screening mammogram for malignant neoplasm of breast  -     Mammo Screening Digital Tomosynthesis Bilateral With CAD; Future    13. Need for influenza vaccination  -     FluLaval >6 Months ([id number])        Follow Up:   Return in about 3 months (around 2021) for Recheck, DM.     An After Visit Summary and PPPS were made available to the patient.

## 2021-09-13 NOTE — PATIENT INSTRUCTIONS
Diabetes Basics       Diabetes (diabetes mellitus) is a long-term (chronic) disease. It occurs when the body does not properly use sugar (glucose) that is released from food after you eat.  Diabetes may be caused by one or both of these problems:  · Your pancreas does not make enough of a hormone called insulin.  · Your body does not react in a normal way to insulin that it makes.  Insulin lets sugars (glucose) go into cells in your body. This gives you energy. If you have diabetes, sugars cannot get into cells. This causes high blood sugar (hyperglycemia).  Follow these instructions at home:  How is diabetes treated?  You may need to take insulin or other diabetes medicines daily to keep your blood sugar in balance. Take your diabetes medicines every day as told by your doctor. List your diabetes medicines here:  Diabetes medicines  1. Name of medicine: ______________________________  ? Amount (dose): _______________ Time (a.m./p.m.): _______________ Notes: ___________________________________  2. Name of medicine: ______________________________  ? Amount (dose): _______________ Time (a.m./p.m.): _______________ Notes: ___________________________________  3. Name of medicine: ______________________________  ? Amount (dose): _______________ Time (a.m./p.m.): _______________ Notes: ___________________________________  If you use insulin, you will learn how to give yourself insulin by injection. You may need to adjust the amount based on the food that you eat. List the types of insulin you use here:  Insulin  1. Insulin type: ______________________________  ? Amount (dose): _______________ Time (a.m./p.m.): _______________ Notes: ___________________________________  2. Insulin type: ______________________________  ? Amount (dose): _______________ Time (a.m./p.m.): _______________ Notes: ___________________________________  3. Insulin type: ______________________________  ? Amount (dose): _______________ Time  (a.m./p.m.): _______________ Notes: ___________________________________  4. Insulin type: ______________________________  ? Amount (dose): _______________ Time (a.m./p.m.): _______________ Notes: ___________________________________  5. Insulin type: ______________________________  ? Amount (dose): _______________ Time (a.m./p.m.): _______________ Notes: ___________________________________  How do I manage my blood sugar?       Check your blood sugar levels using a blood glucose monitor as directed by your doctor.  Your doctor will set treatment goals for you. Generally, you should have these blood sugar levels:  · Before meals (preprandial):  mg/dL (4.4-7.2 mmol/L).  · After meals (postprandial): below 180 mg/dL (10 mmol/L).  · A1c level: less than 7%.  Write down the times that you will check your blood sugar levels:  Blood sugar checks  · Time: _______________ Notes: ___________________________________  · Time: _______________ Notes: ___________________________________  · Time: _______________ Notes: ___________________________________  · Time: _______________ Notes: ___________________________________  · Time: _______________ Notes: ___________________________________  · Time: _______________ Notes: ___________________________________     What do I need to know about low blood sugar?  Low blood sugar is called hypoglycemia. This is when blood sugar is at or below 70 mg/dL (3.9 mmol/L). Symptoms may include:  1. Feeling:  ? Hungry.  ? Worried or nervous (anxious).  ? Sweaty and clammy.  ? Confused.  ? Dizzy.  ? Sleepy.  ? Sick to your stomach (nauseous).  2. Having:  ? A fast heartbeat.  ? A headache.  ? A change in your vision.  ? Tingling or no feeling (numbness) around the mouth, lips, or tongue.  ? Jerky movements that you cannot control (seizure).  3. Having trouble with:  ? Moving (coordination).  ? Sleeping.  ? Passing out (fainting).  ? Getting upset easily (irritability).  Treating low blood  sugar  To treat low blood sugar, eat or drink something sugary right away. If you can think clearly and swallow safely, follow the 15:15 rule:  1. Take 15 grams of a fast-acting carb (carbohydrate). Talk with your doctor about how much you should take.  2. Some fast-acting carbs are:  ? Sugar tablets (glucose pills). Take 3-4 glucose pills.  ? 6-8 pieces of hard candy.  ? 4-6 oz (120-150 mL) of fruit juice.  ? 4-6 oz (120-150 mL) of regular (not diet) soda.  ? 1 Tbsp (15 mL) honey or sugar.  3. Check your blood sugar 15 minutes after you take the carb.  4. If your blood sugar is still at or below 70 mg/dL (3.9 mmol/L), take 15 grams of a carb again.  5. If your blood sugar does not go above 70 mg/dL (3.9 mmol/L) after 3 tries, get help right away.  6. After your blood sugar goes back to normal, eat a meal or a snack within 1 hour.  Treating very low blood sugar  If your blood sugar is at or below 54 mg/dL (3 mmol/L), you have very low blood sugar (severe hypoglycemia). This is an emergency. Do not wait to see if the symptoms will go away. Get medical help right away. Call your local emergency services (911 in the [place] Do not drive yourself to the hospital.  Questions to ask your health care provider  · Do I need to meet with a diabetes educator?  · What equipment will I need to care for myself at home?  · What diabetes medicines do I need? When should I take them?  · How often do I need to check my blood sugar?  · What number can I call if I have questions?  · When is my next doctor's visit?  · Where can I find a support group for people with diabetes?  Where to find more information  · American Diabetes Association: www.diabetes.org  · American Association of Diabetes Educators: www.diabeteseducator.org/patient-resources  Contact a doctor if:  1. Your blood sugar is at or above 240 mg/dL (13.3 mmol/L) for 2 days in a row.  2. You have been sick or have had a fever for 2 days or more, and you are not getting  better.  3. You have any of these problems for more than 6 hours:  ? You cannot eat or drink.  ? You feel sick to your stomach (nauseous).  ? You throw up (vomit).  ? You have watery poop (diarrhea).  Get help right away if:  1. Your blood sugar is lower than 54 mg/dL (3 mmol/L).  2. You get confused.  3. You have trouble:  ? Thinking clearly.  ? Breathing.  Summary  · Diabetes (diabetes mellitus) is a long-term (chronic) disease. It occurs when the body does not properly use sugar (glucose) that is released from food after digestion.  · Take insulin and diabetes medicines as told.  · Check your blood sugar every day, as often as told.  · Keep all follow-up visits as told by your doctor. This is important.  This information is not intended to replace advice given to you by your health care provider. Make sure you discuss any questions you have with your health care provider.  Document Revised: 09/09/2020 Document Reviewed: 03/22/2019  [employer] Patient Education © 2021 [employer]

## 2021-09-14 ENCOUNTER — TELEPHONE (OUTPATIENT)
Dept: FAMILY MEDICINE CLINIC | Facility: CLINIC | Age: 66
End: 2021-09-14

## 2021-09-14 DIAGNOSIS — Z79.4 TYPE 2 DIABETES MELLITUS WITH DIABETIC AUTONOMIC NEUROPATHY, WITH LONG-TERM CURRENT USE OF INSULIN (HCC): Primary | ICD-10-CM

## 2021-09-14 DIAGNOSIS — E11.43 TYPE 2 DIABETES MELLITUS WITH DIABETIC AUTONOMIC NEUROPATHY, WITH LONG-TERM CURRENT USE OF INSULIN (HCC): Primary | ICD-10-CM

## 2021-09-14 NOTE — TELEPHONE ENCOUNTER
If the patient will run out of medication over the weekend add that information to the additional details line. Send this encounter to the clinical pool.    Caller: Johnna Gonsalves    Relationship: Self    Best call back number: [phone]  Medication needed:     GLUCOSE MONITOR/PATCH THAT ATTACHES TO ARM AND READINGS APPEAR ON PHONE DEVICE    When do you need the refill by: 9/14/21  What additional details did the patient provide when requesting the medication: PATIENT STATED PHARMACY HAS NOT RECEIVED ORDER FOR GLUCOSE MONITOR THAT DOCTOR SHIN AND PATIENT DISCUSSED AT APPOINTMENT.  MONITOR THAT ATTACHES TO PATIENTS ARM AND CAN BE READ ON PHONE DEVICE  Does the patient have less than a 3 day supply:  [x] Yes  [] No    What is the patient's preferred pharmacy: [facility] [address] [place] [address] [phone] [facility] [phone] FX          “Thank you for sharing this information with me. I will send a message to the clinical team. Please allow 48 hours for the clinical staff to follow up on this request.”

## 2021-09-15 RX ORDER — FLASH GLUCOSE SCANNING READER
1 EACH MISCELLANEOUS ONCE
Qty: 1 EACH | Refills: 0 | Status: SHIPPED | OUTPATIENT
Start: 2021-09-15 | End: 2021-09-15

## 2021-09-15 RX ORDER — FLASH GLUCOSE SENSOR
KIT MISCELLANEOUS
Qty: 3 EACH | Refills: 11 | Status: SHIPPED | OUTPATIENT
Start: 2021-09-15 | End: 2022-02-15 | Stop reason: SDUPTHER

## 2021-09-15 NOTE — TELEPHONE ENCOUNTER
Called and spoke with pt. Informed pt glucose monitor and patches have been sent to pts pharmacy as requested and will be available for  shortly. Pt verbalized understanding and has no further questions at this time.

## 2021-09-17 DIAGNOSIS — Z12.11 ENCOUNTER FOR SCREENING COLONOSCOPY: Primary | ICD-10-CM

## 2021-10-06 ENCOUNTER — TELEPHONE (OUTPATIENT)
Dept: FAMILY MEDICINE CLINIC | Facility: CLINIC | Age: 66
End: 2021-10-06

## 2021-10-06 DIAGNOSIS — G25.81 RLS (RESTLESS LEGS SYNDROME): Primary | ICD-10-CM

## 2021-10-06 RX ORDER — ROPINIROLE 0.25 MG/1
0.25 TABLET, FILM COATED ORAL NIGHTLY
Qty: 30 TABLET | Refills: 1 | Status: SHIPPED | OUTPATIENT
Start: 2021-10-06 | End: 2021-10-28 | Stop reason: SDUPTHER

## 2021-10-06 NOTE — TELEPHONE ENCOUNTER
Caller: Johnna Gonsalves    Relationship: Self    Best call back number: [phone]    What medication are you requesting: rOPINIRole (Requip) 0.25 MG tablet    What are your current symptoms: RESTLESS LEGS     How long have you been experiencing symptoms: 4-5 YEARS     Have you had these symptoms before:    [x] Yes  [] No    Have you been treated for these symptoms before:   [x] Yes  [] No    If a prescription is needed, what is your preferred pharmacy and phone number:  [facility] IN [place]     Additional notes:

## 2021-10-07 ENCOUNTER — OUTSIDE FACILITY SERVICE (OUTPATIENT)
Dept: GASTROENTEROLOGY | Facility: CLINIC | Age: 66
End: 2021-10-07

## 2021-10-07 PROCEDURE — 45380 COLONOSCOPY AND BIOPSY: CPT | Performed by: INTERNAL MEDICINE

## 2021-10-07 PROCEDURE — 45385 COLONOSCOPY W/LESION REMOVAL: CPT | Performed by: INTERNAL MEDICINE

## 2021-10-07 PROCEDURE — 88305 TISSUE EXAM BY PATHOLOGIST: CPT | Performed by: INTERNAL MEDICINE

## 2021-10-08 ENCOUNTER — LAB REQUISITION (OUTPATIENT)
Dept: LAB | Facility: HOSPITAL | Age: 66
End: 2021-10-08

## 2021-10-08 DIAGNOSIS — K64.8 OTHER HEMORRHOIDS: ICD-10-CM

## 2021-10-08 DIAGNOSIS — Z12.11 ENCOUNTER FOR SCREENING FOR MALIGNANT NEOPLASM OF COLON: ICD-10-CM

## 2021-10-08 DIAGNOSIS — K63.5 POLYP OF COLON: ICD-10-CM

## 2021-10-11 LAB
CYTO UR: NORMAL
LAB AP CASE REPORT: NORMAL
LAB AP CLINICAL INFORMATION: NORMAL
PATH REPORT.FINAL DX SPEC: NORMAL
PATH REPORT.GROSS SPEC: NORMAL

## 2021-10-27 ENCOUNTER — HOSPITAL ENCOUNTER (OUTPATIENT)
Dept: CT IMAGING | Facility: HOSPITAL | Age: 66
Discharge: HOME | End: 2021-10-27

## 2021-10-27 ENCOUNTER — APPOINTMENT (OUTPATIENT)
Dept: OTHER | Facility: HOSPITAL | Age: 66
End: 2021-10-27

## 2021-10-27 ENCOUNTER — HOSPITAL ENCOUNTER (OUTPATIENT)
Dept: MAMMOGRAPHY | Facility: HOSPITAL | Age: 66
Discharge: HOME | End: 2021-10-27

## 2021-10-27 DIAGNOSIS — Z12.31 ENCOUNTER FOR SCREENING MAMMOGRAM FOR MALIGNANT NEOPLASM OF BREAST: ICD-10-CM

## 2021-10-27 DIAGNOSIS — R10.11 RIGHT UPPER QUADRANT PAIN: ICD-10-CM

## 2021-10-27 DIAGNOSIS — S30.1XXA CONTUSION OF ABDOMINAL WALL, INITIAL ENCOUNTER: ICD-10-CM

## 2021-10-27 PROCEDURE — 25010000002 IOPAMIDOL 61 % SOLUTION: Performed by: PHYSICIAN ASSISTANT

## 2021-10-27 PROCEDURE — 82565 ASSAY OF CREATININE: CPT

## 2021-10-27 PROCEDURE — 74177 CT ABD & PELVIS W/CONTRAST: CPT

## 2021-10-27 PROCEDURE — 77067 SCR MAMMO BI INCL CAD: CPT

## 2021-10-27 PROCEDURE — 77063 BREAST TOMOSYNTHESIS BI: CPT

## 2021-10-27 PROCEDURE — 77063 BREAST TOMOSYNTHESIS BI: CPT | Performed by: RADIOLOGY

## 2021-10-27 PROCEDURE — 77067 SCR MAMMO BI INCL CAD: CPT | Performed by: RADIOLOGY

## 2021-10-27 RX ADMIN — IOPAMIDOL 95 ML: 612 INJECTION, SOLUTION INTRAVENOUS at 11:20

## 2021-10-28 ENCOUNTER — TELEPHONE (OUTPATIENT)
Dept: FAMILY MEDICINE CLINIC | Facility: CLINIC | Age: 66
End: 2021-10-28

## 2021-10-28 DIAGNOSIS — R14.1 GAS PAIN: ICD-10-CM

## 2021-10-28 DIAGNOSIS — G25.81 RLS (RESTLESS LEGS SYNDROME): ICD-10-CM

## 2021-10-28 DIAGNOSIS — R10.13 EPIGASTRIC PAIN: Primary | ICD-10-CM

## 2021-10-28 RX ORDER — ROPINIROLE 0.5 MG/1
0.5 TABLET, FILM COATED ORAL NIGHTLY
Qty: 90 TABLET | Refills: 1 | Status: SHIPPED | OUTPATIENT
Start: 2021-10-28 | End: 2022-02-15 | Stop reason: SDUPTHER

## 2021-10-28 RX ORDER — SIMETHICONE 80 MG
80 TABLET,CHEWABLE ORAL EVERY 6 HOURS PRN
Qty: 90 TABLET | Refills: 1 | Status: SHIPPED | OUTPATIENT
Start: 2021-10-28 | End: 2022-02-15 | Stop reason: SDUPTHER

## 2021-10-28 RX ORDER — OMEPRAZOLE 20 MG/1
20 CAPSULE, DELAYED RELEASE ORAL
Qty: 90 CAPSULE | Refills: 1 | Status: SHIPPED | OUTPATIENT
Start: 2021-10-28 | End: 2022-02-15 | Stop reason: SDUPTHER

## 2021-10-28 NOTE — TELEPHONE ENCOUNTER
Spoke with patient regarding her symptoms.  Will trial omeprazole and simethicone.  She will call if pain persists.

## 2021-10-29 LAB — CREAT BLDA-MCNC: 0.8 MG/DL (ref 0.6–1.3)

## 2021-12-13 ENCOUNTER — OFFICE VISIT (OUTPATIENT)
Dept: FAMILY MEDICINE CLINIC | Facility: CLINIC | Age: 66
End: 2021-12-13

## 2021-12-13 ENCOUNTER — LAB (OUTPATIENT)
Dept: LAB | Facility: HOSPITAL | Age: 66
End: 2021-12-13

## 2021-12-13 VITALS
TEMPERATURE: 97.7 F | HEART RATE: 101 BPM | DIASTOLIC BLOOD PRESSURE: 100 MMHG | WEIGHT: 209.6 LBS | OXYGEN SATURATION: 98 % | SYSTOLIC BLOOD PRESSURE: 160 MMHG | BODY MASS INDEX: 41.15 KG/M2 | HEIGHT: 60 IN

## 2021-12-13 DIAGNOSIS — E11.43 TYPE 2 DIABETES MELLITUS WITH DIABETIC AUTONOMIC NEUROPATHY, WITH LONG-TERM CURRENT USE OF INSULIN (HCC): ICD-10-CM

## 2021-12-13 DIAGNOSIS — E11.43 TYPE 2 DIABETES MELLITUS WITH DIABETIC AUTONOMIC NEUROPATHY, WITH LONG-TERM CURRENT USE OF INSULIN (HCC): Primary | ICD-10-CM

## 2021-12-13 DIAGNOSIS — Z91.14 NONCOMPLIANCE WITH MEDICATIONS: ICD-10-CM

## 2021-12-13 DIAGNOSIS — Z79.4 TYPE 2 DIABETES MELLITUS WITH DIABETIC AUTONOMIC NEUROPATHY, WITH LONG-TERM CURRENT USE OF INSULIN (HCC): Primary | ICD-10-CM

## 2021-12-13 DIAGNOSIS — H61.22 IMPACTED CERUMEN OF LEFT EAR: ICD-10-CM

## 2021-12-13 DIAGNOSIS — I10 ESSENTIAL HYPERTENSION: ICD-10-CM

## 2021-12-13 DIAGNOSIS — Z79.4 TYPE 2 DIABETES MELLITUS WITH DIABETIC AUTONOMIC NEUROPATHY, WITH LONG-TERM CURRENT USE OF INSULIN (HCC): ICD-10-CM

## 2021-12-13 DIAGNOSIS — R13.10 DYSPHAGIA, UNSPECIFIED TYPE: ICD-10-CM

## 2021-12-13 DIAGNOSIS — J01.10 ACUTE NON-RECURRENT FRONTAL SINUSITIS: ICD-10-CM

## 2021-12-13 PROBLEM — Z91.148 NONCOMPLIANCE WITH MEDICATIONS: Status: ACTIVE | Noted: 2021-12-13

## 2021-12-13 PROCEDURE — 99214 OFFICE O/P EST MOD 30 MIN: CPT | Performed by: PHYSICIAN ASSISTANT

## 2021-12-13 PROCEDURE — 69209 REMOVE IMPACTED EAR WAX UNI: CPT | Performed by: PHYSICIAN ASSISTANT

## 2021-12-13 RX ORDER — AZITHROMYCIN 250 MG/1
TABLET, FILM COATED ORAL
Qty: 6 TABLET | Refills: 0 | Status: SHIPPED | OUTPATIENT
Start: 2021-12-13 | End: 2022-02-07

## 2021-12-13 NOTE — PROGRESS NOTES
Chief Complaint   Patient presents with   • Diabetes   • Hypertension     Patient forgot to take her medicine       HPI     Johnna Gonsalves is a pleasant 66 y.o. female who is here for routine follow-up of diabetes type 2 and hypertension.  Patient admits that she is not regularly taking her medications.  She did not have her blood pressure medication today.  She is not taking her diabetes medicine daily either.    She reports difficulty swallowing at times and feeling like food or medication is getting stuck in her throat.  She has not had an EGD.    Her main concern today is facial pain, headache and ear congestion that she has had for several weeks.  She reports that the pain is not improving but getting worse.  She denies cough, fever or shortness of breath.  She is allergic to sulfa.    Past Medical History:   Diagnosis Date   • Anemia    • Arthritis    • Carpal tunnel syndrome    • Cataract    • Chronic tension headaches    • Diabetes (HCC)    • Diabetic neuropathy (HCC)    • Dizziness    • Environmental allergies    • Fibromyalgia    • Fibromyalgia    • Fractures    • Gall stones    • Heart attack (HCC)    • Hypertension    • Kidney infection    • Migraine headache    • Numbness and tingling    • Ovarian cyst    • Shingles    • Sleep apnea        Past Surgical History:   Procedure Laterality Date   • BIOPSY OF LEG     • CATARACT EXTRACTION     •  SECTION     • CHEST WALL BIOPSY     • CHOLECYSTECTOMY     • HYSTERECTOMY     • OOPHORECTOMY         Family History   Problem Relation Age of Onset   • Diabetes Mother    • Dementia Mother    • Heart disease Mother    • Hypertension Mother    • Hyperlipidemia Mother    • Kidney disease Mother    • Neuropathy Mother    • Obesity Mother    • Thyroid disease Mother    • Heart attack Mother    • Heart disease Father    • Lung cancer Father         smoker   • Heart attack Father    • Cancer Sister         bile duct   • Obesity Sister    • Breast cancer  "Maternal Aunt    • Lung cancer Maternal Aunt         smoker   • Colon cancer Neg Hx    • Ovarian cancer Neg Hx        Social History     Socioeconomic History   • Marital status:    Tobacco Use   • Smoking status: Never Smoker   • Smokeless tobacco: Never Used   Vaping Use   • Vaping Use: Never used   Substance and Sexual Activity   • Alcohol use: Never   • Drug use: Never   • Sexual activity: Defer       Allergies   Allergen Reactions   • Sulfa Antibiotics Swelling       ROS  Review of Systems   Constitutional: Positive for fatigue. Negative for chills, diaphoresis and fever.   HENT: Positive for congestion, ear pain, postnasal drip, rhinorrhea and sinus pressure. Negative for sore throat.    Respiratory: Negative for cough, shortness of breath and wheezing.    Cardiovascular: Negative for chest pain and leg swelling.   Endocrine: Positive for polyphagia. Negative for polyuria.   Musculoskeletal: Positive for arthralgias, myalgias and neck pain. Negative for gait problem.   Neurological: Negative for dizziness and headache.   Psychiatric/Behavioral: Positive for stress. Negative for self-injury, sleep disturbance, suicidal ideas and depressed mood. The patient is not nervous/anxious.        Vitals:    12/13/21 1002   BP: 160/100   Pulse: 101   Temp: 97.7 °F (36.5 °C)   SpO2: 98%   Weight: 95.1 kg (209 lb 9.6 oz)   Height: 152.4 cm (60\")     Body mass index is 40.93 kg/m².    Current Outpatient Medications on File Prior to Visit   Medication Sig Dispense Refill   • aspirin 81 MG chewable tablet Chew 81 mg Daily.     • atorvastatin (LIPITOR) 40 MG tablet Take 40 mg by mouth Daily.     • bisoprolol (ZEBeta) 10 MG tablet      • Cholecalciferol (vitamin D3) 125 MCG (5000 UT) capsule capsule Take 5,000 Units by mouth Daily.     • Continuous Blood Gluc Sensor (Expert360 Brianna Sensor System) Every 10 (Ten) Days. 3 each 11   • Cyanocobalamin (Vitamin B-12) 5000 MCG sublingual tablet Place  under the tongue.     • dry " mouth gel (BIOTENE ORALBALANCE) gel Apply  to the mouth or throat As Needed for Dry Mouth.     • DULoxetine (CYMBALTA) 60 MG capsule Take 60 mg by mouth Daily.     • escitalopram (Lexapro) 10 MG tablet Take 1 tablet by mouth Daily. 30 tablet 5   • Folic Acid (FOLATE PO) Take  by mouth.     • glimepiride (AMARYL) 4 MG tablet      • hydroxychloroquine (PLAQUENIL) 200 MG tablet Take 200 mg by mouth Daily.     • insulin aspart prot-insulin aspart (novoLOG 70/30) (70-30) 100 UNIT/ML injection Inject 100 Units under the skin into the appropriate area as directed 2 (Two) Times a Day With Meals.     • isosorbide dinitrate (ISORDIL) 30 MG tablet      • Jardiance 25 MG tablet tablet      • losartan (COZAAR) 100 MG tablet Take 100 mg by mouth Daily.     • meclizine (ANTIVERT) 25 MG tablet Take 25 mg by mouth 3 (Three) Times a Day As Needed for Dizziness.     • omeprazole (PrilOSEC) 20 MG capsule Take 1 capsule by mouth Every Morning Before Breakfast. 90 capsule 1   • ondansetron ODT (ZOFRAN-ODT) 4 MG disintegrating tablet 4 mg.     • oxymetazoline (AFRIN) 0.05 % nasal spray 2 sprays into the nostril(s) as directed by provider 2 (Two) Times a Day.     • rOPINIRole (Requip) 0.5 MG tablet Take 1 tablet by mouth Every Night. Take 1 hour before bedtime. 90 tablet 1   • simethicone (Gas-X) 80 MG chewable tablet Chew 1 tablet Every 6 (Six) Hours As Needed for Flatulence. 90 tablet 1   • Sod Picosulfate-Mag Ox-Cit Acd 10-3.5-12 MG-GM -GM/160ML solution Take 1 kit by mouth Take As Directed. 320 mL 0   • triamterene-hydrochlorothiazide (MAXZIDE-25) 37.5-25 MG per tablet Take 1 tablet by mouth Daily.       No current facility-administered medications on file prior to visit.       Results for orders placed or performed during the hospital encounter of 10/27/21   POC Creatinine    Specimen: Blood   Result Value Ref Range    Creatinine 0.80 0.60 - 1.30 mg/dL     Ear Cerumen Removal    Date/Time: 12/13/2021 10:48 AM  Performed by:  Tracee Gaona PA-C  Authorized by: Tracee Gaona PA-C     Anesthesia:  Local Anesthetic: none  Location details: left ear  Patient tolerance: patient tolerated the procedure well with no immediate complications  Procedure type: irrigation   Sedation:  Patient sedated: no            PE    Physical Exam  Vitals reviewed.   Constitutional:       General: She is not in acute distress.     Appearance: Normal appearance. She is well-developed. She is morbidly obese. She is not ill-appearing or diaphoretic.   HENT:      Head: Normocephalic and atraumatic.      Right Ear: Hearing, tympanic membrane, ear canal and external ear normal.      Left Ear: Hearing, ear canal and external ear normal. There is impacted cerumen.      Nose:      Right Sinus: Maxillary sinus tenderness present. No frontal sinus tenderness.      Left Sinus: Maxillary sinus tenderness present. No frontal sinus tenderness.      Mouth/Throat:      Pharynx: Uvula midline. No posterior oropharyngeal erythema.   Eyes:      Extraocular Movements: Extraocular movements intact.      Conjunctiva/sclera: Conjunctivae normal.   Cardiovascular:      Rate and Rhythm: Normal rate and regular rhythm.      Heart sounds: Normal heart sounds. No murmur heard.  No friction rub. No gallop.    Pulmonary:      Effort: Pulmonary effort is normal. No respiratory distress.      Breath sounds: Normal breath sounds.   Musculoskeletal:         General: Normal range of motion.      Cervical back: Normal range of motion.      Right lower leg: No edema.      Left lower leg: No edema.   Skin:     General: Skin is warm.      Findings: No erythema or rash.   Neurological:      General: No focal deficit present.      Mental Status: She is alert and oriented to person, place, and time.   Psychiatric:         Attention and Perception: Attention and perception normal. She is attentive.         Mood and Affect: Mood and affect normal.         Speech: Speech normal.          Behavior: Behavior normal. Behavior is cooperative.         Thought Content: Thought content normal.         Cognition and Memory: Cognition and memory normal.         Judgment: Judgment normal.         A/P    Diagnoses and all orders for this visit:    1. Type 2 diabetes mellitus with diabetic autonomic neuropathy, with long-term current use of insulin (HCC) (Primary)  -     Hemoglobin A1c; Future  -     Basic Metabolic Panel; Future  Not taking medication regularly.  Previous hemoglobin AIC was 9.4%, AIC is pending for today.    2. Essential hypertension  Elevated.  Did not take medication today.    3. Acute non-recurrent frontal sinusitis  -     azithromycin (Zithromax Z-Donnie) 250 MG tablet; Take 2 tablets the first day, then 1 tablet daily for 4 days.  Dispense: 6 tablet; Refill: 0  TTP along maxillary sinuses.    4. Dysphagia, unspecified type  -     Ambulatory referral for Screening EGD    5. Impacted cerumen of left ear  -     Cerumen Removal    6. Noncompliance with medications  Discussed importance with patient of taking her medication.  There is no way to adjust medication or get her diabetes/hypertension under control without compliance.  Discussed the risk of uncontrolled diabetes/hypertension is associated with stroke, MI and long-term health issues.       Plan of care reviewed with patient at the conclusion of today's visit. Education was provided regarding diagnosis, management and any prescribed or recommended OTC medications.  Patient verbalizes understanding of and agreement with management plan.    Return in about 3 months (around 3/13/2022) for Recheck, DM/HTN.     Tracee Gaona PA-C

## 2021-12-17 DIAGNOSIS — Z01.818 PREOP TESTING: Primary | ICD-10-CM

## 2021-12-20 ENCOUNTER — APPOINTMENT (OUTPATIENT)
Dept: PREADMISSION TESTING | Facility: HOSPITAL | Age: 66
End: 2021-12-20

## 2021-12-21 ENCOUNTER — TELEPHONE (OUTPATIENT)
Dept: GASTROENTEROLOGY | Facility: CLINIC | Age: 66
End: 2021-12-21

## 2021-12-21 ENCOUNTER — HOSPITAL ENCOUNTER (OUTPATIENT)
Dept: BONE DENSITY | Facility: HOSPITAL | Age: 66
Discharge: HOME | End: 2021-12-21
Admitting: PHYSICIAN ASSISTANT

## 2021-12-21 PROCEDURE — 77080 DXA BONE DENSITY AXIAL: CPT

## 2021-12-21 NOTE — TELEPHONE ENCOUNTER
COVID TESTING CALLED TO LET US KNOW PATIENT NO SHOWED HER APPT. CALLED PATIENT TO SEE IF SHE GOT COVID TESTING ON HER OWN OR IF WE NEEDED TO RESCHEDULE HER APPT.   light-headedness

## 2021-12-22 ENCOUNTER — OUTSIDE FACILITY SERVICE (OUTPATIENT)
Dept: GASTROENTEROLOGY | Facility: CLINIC | Age: 66
End: 2021-12-22

## 2021-12-22 PROCEDURE — 43239 EGD BIOPSY SINGLE/MULTIPLE: CPT | Performed by: INTERNAL MEDICINE

## 2021-12-22 PROCEDURE — 43248 EGD GUIDE WIRE INSERTION: CPT | Performed by: INTERNAL MEDICINE

## 2021-12-22 PROCEDURE — 88305 TISSUE EXAM BY PATHOLOGIST: CPT | Performed by: INTERNAL MEDICINE

## 2021-12-22 RX ORDER — PANTOPRAZOLE SODIUM 40 MG/1
TABLET, DELAYED RELEASE ORAL
Qty: 90 TABLET | Refills: 3 | Status: CANCELLED | OUTPATIENT
Start: 2021-12-22

## 2021-12-23 ENCOUNTER — LAB REQUISITION (OUTPATIENT)
Dept: LAB | Facility: HOSPITAL | Age: 66
End: 2021-12-23

## 2021-12-23 DIAGNOSIS — K31.89 OTHER DISEASES OF STOMACH AND DUODENUM: ICD-10-CM

## 2021-12-23 DIAGNOSIS — Q39.9 CONGENITAL MALFORMATION OF ESOPHAGUS, UNSPECIFIED: ICD-10-CM

## 2021-12-23 DIAGNOSIS — K22.89 OTHER SPECIFIED DISEASE OF ESOPHAGUS: ICD-10-CM

## 2021-12-23 DIAGNOSIS — R13.10 DYSPHAGIA, UNSPECIFIED: ICD-10-CM

## 2022-02-07 ENCOUNTER — OFFICE VISIT (OUTPATIENT)
Dept: FAMILY MEDICINE CLINIC | Facility: CLINIC | Age: 67
End: 2022-02-07

## 2022-02-07 VITALS
HEIGHT: 60 IN | TEMPERATURE: 97.8 F | OXYGEN SATURATION: 95 % | DIASTOLIC BLOOD PRESSURE: 82 MMHG | WEIGHT: 214.8 LBS | BODY MASS INDEX: 42.17 KG/M2 | SYSTOLIC BLOOD PRESSURE: 140 MMHG | HEART RATE: 86 BPM

## 2022-02-07 DIAGNOSIS — R06.83 SNORING: ICD-10-CM

## 2022-02-07 DIAGNOSIS — R20.9 DISTURBANCE OF SKIN SENSATION: ICD-10-CM

## 2022-02-07 DIAGNOSIS — Z79.4 TYPE 2 DIABETES MELLITUS WITH DIABETIC AUTONOMIC NEUROPATHY, WITH LONG-TERM CURRENT USE OF INSULIN: Primary | ICD-10-CM

## 2022-02-07 DIAGNOSIS — R42 DIZZINESS: ICD-10-CM

## 2022-02-07 DIAGNOSIS — R40.0 DAYTIME SOMNOLENCE: ICD-10-CM

## 2022-02-07 DIAGNOSIS — M54.2 NECK PAIN, ACUTE: ICD-10-CM

## 2022-02-07 DIAGNOSIS — E11.43 TYPE 2 DIABETES MELLITUS WITH DIABETIC AUTONOMIC NEUROPATHY, WITH LONG-TERM CURRENT USE OF INSULIN: Primary | ICD-10-CM

## 2022-02-07 LAB
EXPIRATION DATE: NORMAL
HBA1C MFR BLD: 11.2 %
Lab: NORMAL

## 2022-02-07 PROCEDURE — 3046F HEMOGLOBIN A1C LEVEL >9.0%: CPT | Performed by: PHYSICIAN ASSISTANT

## 2022-02-07 PROCEDURE — 83036 HEMOGLOBIN GLYCOSYLATED A1C: CPT | Performed by: PHYSICIAN ASSISTANT

## 2022-02-07 PROCEDURE — 99215 OFFICE O/P EST HI 40 MIN: CPT | Performed by: PHYSICIAN ASSISTANT

## 2022-02-07 RX ORDER — EMPAGLIFLOZIN 25 MG/1
25 TABLET, FILM COATED ORAL DAILY
Qty: 90 TABLET | Refills: 1 | Status: SHIPPED | OUTPATIENT
Start: 2022-02-07 | End: 2022-02-15

## 2022-02-07 RX ORDER — INSULIN ASPART 100 [IU]/ML
100 INJECTION, SUSPENSION SUBCUTANEOUS 2 TIMES DAILY WITH MEALS
Qty: 60 ML | Refills: 0 | Status: SHIPPED | OUTPATIENT
Start: 2022-02-07 | End: 2022-02-15
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
## 2022-02-07 NOTE — PROGRESS NOTES
I have reviewed the notes, assessments, and/or procedures performed by ELVIA Seymour, I concur with her/his documentation of Johnna Gonsalves.

## 2022-02-09 ENCOUNTER — TELEPHONE (OUTPATIENT)
Dept: FAMILY MEDICINE CLINIC | Facility: CLINIC | Age: 67
End: 2022-02-09

## 2022-02-09 NOTE — TELEPHONE ENCOUNTER
HUB RELAYED MESSAGE:    PT WILL LIKE FOR FORM TO BE FAXED OVER TO [facility] FAX:[phone]

## 2022-02-09 NOTE — TELEPHONE ENCOUNTER
Attempted to contact patient, no answer. Left voicemail for patient to call the office back (office # given).     Hub may relay message & document.     Pt's diabetic shoe form packet has been completed by PCP and is ready for . Please verify if patient will be able to  the packet to take to [facility]. If so, please notify patient that the packet will be available at patient . If she is not able to pick it up please let us know.

## 2022-02-15 DIAGNOSIS — E11.65 UNCONTROLLED TYPE 2 DIABETES MELLITUS WITH HYPERGLYCEMIA: Primary | ICD-10-CM

## 2022-02-15 DIAGNOSIS — E11.43 TYPE 2 DIABETES MELLITUS WITH DIABETIC AUTONOMIC NEUROPATHY, WITH LONG-TERM CURRENT USE OF INSULIN: ICD-10-CM

## 2022-02-15 DIAGNOSIS — E11.65 UNCONTROLLED TYPE 2 DIABETES MELLITUS WITH HYPERGLYCEMIA: ICD-10-CM

## 2022-02-15 DIAGNOSIS — R45.4 IRRITABILITY: ICD-10-CM

## 2022-02-15 DIAGNOSIS — R10.13 EPIGASTRIC PAIN: ICD-10-CM

## 2022-02-15 DIAGNOSIS — M79.7 FIBROMYALGIA: ICD-10-CM

## 2022-02-15 DIAGNOSIS — G25.81 RLS (RESTLESS LEGS SYNDROME): ICD-10-CM

## 2022-02-15 DIAGNOSIS — Z79.4 TYPE 2 DIABETES MELLITUS WITH DIABETIC AUTONOMIC NEUROPATHY, WITH LONG-TERM CURRENT USE OF INSULIN: ICD-10-CM

## 2022-02-15 DIAGNOSIS — I10 ESSENTIAL HYPERTENSION: Primary | ICD-10-CM

## 2022-02-15 DIAGNOSIS — E78.2 MIXED HYPERLIPIDEMIA: ICD-10-CM

## 2022-02-15 DIAGNOSIS — R14.1 GAS PAIN: ICD-10-CM

## 2022-02-15 RX ORDER — FLASH GLUCOSE SENSOR
KIT MISCELLANEOUS
Qty: 3 EACH | Refills: 11 | Status: SHIPPED | OUTPATIENT
Start: 2022-02-15 | End: 2022-03-02 | Stop reason: SDUPTHER

## 2022-02-15 RX ORDER — ROPINIROLE 0.5 MG/1
0.5 TABLET, FILM COATED ORAL NIGHTLY
Qty: 90 TABLET | Refills: 1 | Status: SHIPPED | OUTPATIENT
Start: 2022-02-15 | End: 2022-03-02 | Stop reason: SDUPTHER

## 2022-02-15 RX ORDER — OMEPRAZOLE 20 MG/1
20 CAPSULE, DELAYED RELEASE ORAL
Qty: 90 CAPSULE | Refills: 1 | Status: SHIPPED | OUTPATIENT
Start: 2022-02-15 | End: 2022-03-02 | Stop reason: SDUPTHER

## 2022-02-15 RX ORDER — ATORVASTATIN CALCIUM 40 MG/1
40 TABLET, FILM COATED ORAL NIGHTLY
Qty: 30 TABLET | Refills: 0 | Status: CANCELLED | OUTPATIENT
Start: 2022-02-15

## 2022-02-15 RX ORDER — DULOXETIN HYDROCHLORIDE 60 MG/1
60 CAPSULE, DELAYED RELEASE ORAL DAILY
Qty: 30 CAPSULE | Refills: 0 | Status: CANCELLED | OUTPATIENT
Start: 2022-02-15

## 2022-02-15 RX ORDER — BISOPROLOL FUMARATE 10 MG/1
10 TABLET, FILM COATED ORAL DAILY
Qty: 30 TABLET | Refills: 0 | Status: CANCELLED | OUTPATIENT
Start: 2022-02-15

## 2022-02-15 RX ORDER — INSULIN ASPART 100 [IU]/ML
100 INJECTION, SUSPENSION SUBCUTANEOUS
Qty: 60 ML | Refills: 0 | Status: CANCELLED | OUTPATIENT
Start: 2022-02-15 | End: 2022-03-17

## 2022-02-15 RX ORDER — GLIMEPIRIDE 4 MG/1
4 TABLET ORAL
Qty: 30 TABLET | Refills: 0 | Status: SHIPPED | OUTPATIENT
Start: 2022-02-15 | End: 2022-03-02 | Stop reason: SDUPTHER

## 2022-02-15 RX ORDER — SIMETHICONE 80 MG
80 TABLET,CHEWABLE ORAL EVERY 6 HOURS PRN
Qty: 90 TABLET | Refills: 1 | Status: SHIPPED | OUTPATIENT
Start: 2022-02-15 | End: 2022-03-02 | Stop reason: SDUPTHER

## 2022-02-15 RX ORDER — ESCITALOPRAM OXALATE 10 MG/1
10 TABLET ORAL DAILY
Qty: 30 TABLET | Refills: 5 | Status: SHIPPED | OUTPATIENT
Start: 2022-02-15 | End: 2022-03-02 | Stop reason: SDUPTHER

## 2022-02-15 NOTE — TELEPHONE ENCOUNTER
Rx Refill Note  Requested Prescriptions     Pending Prescriptions Disp Refills   • omeprazole (PrilOSEC) 20 MG capsule 90 capsule 1     Sig: Take 1 capsule by mouth Every Morning Before Breakfast.   • rOPINIRole (Requip) 0.5 MG tablet 90 tablet 1     Sig: Take 1 tablet by mouth Every Night. Take 1 hour before bedtime.   • simethicone (Gas-X) 80 MG chewable tablet 90 tablet 1     Sig: Chew 1 tablet Every 6 (Six) Hours As Needed for Flatulence.   • Continuous Blood Gluc Sensor (Extreme Startupse Sensor System) 3 each 11     Sig: Every 10 (Ten) Days.   • escitalopram (Lexapro) 10 MG tablet 30 tablet 5     Sig: Take 1 tablet by mouth Daily.      Last office visit with prescribing clinician: 2/7/2022      Next office visit with prescribing clinician: 3/8/2022            Lita Hendricks MA  02/15/22, 08:52 EST

## 2022-02-28 ENCOUNTER — TELEPHONE (OUTPATIENT)
Dept: FAMILY MEDICINE CLINIC | Facility: CLINIC | Age: 67
End: 2022-02-28

## 2022-02-28 DIAGNOSIS — I10 ESSENTIAL HYPERTENSION: Primary | ICD-10-CM

## 2022-02-28 RX ORDER — TRIAMTERENE AND HYDROCHLOROTHIAZIDE 37.5; 25 MG/1; MG/1
1 TABLET ORAL DAILY
Qty: 90 TABLET | Refills: 0 | Status: SHIPPED | OUTPATIENT
Start: 2022-02-28 | End: 2022-03-02 | Stop reason: SDUPTHER

## 2022-02-28 RX ORDER — BISOPROLOL FUMARATE 10 MG/1
10 TABLET, FILM COATED ORAL DAILY
Qty: 90 TABLET | Refills: 0 | Status: SHIPPED | OUTPATIENT
Start: 2022-02-28 | End: 2022-03-02 | Stop reason: SDUPTHER

## 2022-02-28 NOTE — TELEPHONE ENCOUNTER
Rx Refill Note  Requested Prescriptions     Pending Prescriptions Disp Refills   • triamterene-hydrochlorothiazide (MAXZIDE-25) 37.5-25 MG per tablet       Sig: Take 1 tablet by mouth Daily.   • bisoprolol (ZEBeta) 10 MG tablet        Last office visit with prescribing clinician: 2/7/2022      Next office visit with prescribing clinician: 3/8/2022            Starla Wolff LPN  02/28/22, 09:51 EST       Last BUN, Creatinine, and Potassium was 12/31/21 noted these meds are historical and not currently ordered by provider. Please advise, thanks

## 2022-02-28 NOTE — TELEPHONE ENCOUNTER
Labs from December reviewed.  Will send in refill of prescription medications.  Please have patient bring in medication to her next appointment.  We will also need to do repeat lab work at that time.

## 2022-02-28 NOTE — TELEPHONE ENCOUNTER
Caller: Johnna Gonsalves    Relationship: Self    Best call back number:    [phone]     Requested Prescriptions:     bisoprolol (ZEBeta) 10 MG tablet    triamterene-hydrochlorothiazide (MAXZIDE-25) 37.5-25 MG per tablet    IT WAS NOTED THAT THESE MEDICATIONS NEED TO BE REORDERED    Pharmacy where request should be sent:      [facility] - [place], [place]    TELEPHONE CONTACT:    [phone]    Additional details provided by patient:     PATIENT STATED SHE IS OUT OF BOTH MEDICATIONS    Does the patient have less than a 3 day supply:  [x] Yes  [] No    Jerry Garces Rep   02/28/22 08:57 EST     TONNY MELISSA

## 2022-03-01 ENCOUNTER — PRIOR AUTHORIZATION (OUTPATIENT)
Dept: FAMILY MEDICINE CLINIC | Facility: CLINIC | Age: 67
End: 2022-03-01

## 2022-03-01 NOTE — TELEPHONE ENCOUNTER
Completed Prior Auth for Freestyle Brianna Sensor System    KEY: [id number]  Awaiting determination

## 2022-03-02 DIAGNOSIS — R14.1 GAS PAIN: ICD-10-CM

## 2022-03-02 DIAGNOSIS — R10.13 EPIGASTRIC PAIN: ICD-10-CM

## 2022-03-02 DIAGNOSIS — Z79.4 TYPE 2 DIABETES MELLITUS WITH DIABETIC AUTONOMIC NEUROPATHY, WITH LONG-TERM CURRENT USE OF INSULIN: ICD-10-CM

## 2022-03-02 DIAGNOSIS — G25.81 RLS (RESTLESS LEGS SYNDROME): ICD-10-CM

## 2022-03-02 DIAGNOSIS — R45.4 IRRITABILITY: ICD-10-CM

## 2022-03-02 DIAGNOSIS — I10 ESSENTIAL HYPERTENSION: ICD-10-CM

## 2022-03-02 DIAGNOSIS — E11.43 TYPE 2 DIABETES MELLITUS WITH DIABETIC AUTONOMIC NEUROPATHY, WITH LONG-TERM CURRENT USE OF INSULIN: ICD-10-CM

## 2022-03-02 RX ORDER — OMEPRAZOLE 20 MG/1
20 CAPSULE, DELAYED RELEASE ORAL
Qty: 90 CAPSULE | Refills: 1 | Status: SHIPPED | OUTPATIENT
Start: 2022-03-02 | End: 2022-06-28 | Stop reason: SDUPTHER

## 2022-03-02 RX ORDER — TRIAMTERENE AND HYDROCHLOROTHIAZIDE 37.5; 25 MG/1; MG/1
1 TABLET ORAL DAILY
Qty: 90 TABLET | Refills: 0 | Status: SHIPPED | OUTPATIENT
Start: 2022-03-02 | End: 2022-05-24

## 2022-03-02 RX ORDER — BISOPROLOL FUMARATE 10 MG/1
10 TABLET, FILM COATED ORAL DAILY
Qty: 90 TABLET | Refills: 0 | Status: SHIPPED | OUTPATIENT
Start: 2022-03-02 | End: 2022-05-24

## 2022-03-02 RX ORDER — GLIMEPIRIDE 4 MG/1
4 TABLET ORAL
Qty: 30 TABLET | Refills: 0 | Status: SHIPPED | OUTPATIENT
Start: 2022-03-02 | End: 2022-03-11 | Stop reason: SDUPTHER

## 2022-03-02 RX ORDER — FLASH GLUCOSE SENSOR
KIT MISCELLANEOUS
Qty: 3 EACH | Refills: 11 | Status: SHIPPED | OUTPATIENT
Start: 2022-03-02 | End: 2022-07-29

## 2022-03-02 RX ORDER — SIMETHICONE 80 MG
80 TABLET,CHEWABLE ORAL EVERY 6 HOURS PRN
Qty: 90 TABLET | Refills: 1 | Status: SHIPPED | OUTPATIENT
Start: 2022-03-02 | End: 2023-02-02

## 2022-03-02 RX ORDER — ROPINIROLE 0.5 MG/1
0.5 TABLET, FILM COATED ORAL NIGHTLY
Qty: 90 TABLET | Refills: 1 | Status: SHIPPED | OUTPATIENT
Start: 2022-03-02

## 2022-03-02 RX ORDER — ESCITALOPRAM OXALATE 10 MG/1
10 TABLET ORAL DAILY
Qty: 30 TABLET | Refills: 5 | Status: SHIPPED | OUTPATIENT
Start: 2022-03-02 | End: 2022-05-24

## 2022-03-02 NOTE — TELEPHONE ENCOUNTER
Rx Refill Note  Requested Prescriptions     Signed Prescriptions Disp Refills   • bisoprolol (ZEBeta) 10 MG tablet 90 tablet 0     Sig: Take 1 tablet by mouth Daily.     Authorizing Provider: TONNY MELISSA     Ordering User: LITA HENDRICKS   • triamterene-hydrochlorothiazide (MAXZIDE-25) 37.5-25 MG per tablet 90 tablet 0     Sig: Take 1 tablet by mouth Daily.     Authorizing Provider: TONNY MELISSA     Ordering User: LITA HENDRICKS   • Continuous Blood Gluc Sensor (FreeStyle Brianna Sensor System) 3 each 11     Sig: Every 10 (Ten) Days.     Authorizing Provider: TONYN MELISSA     Ordering User: LITA HENDRICKS   • escitalopram (Lexapro) 10 MG tablet 30 tablet 5     Sig: Take 1 tablet by mouth Daily.     Authorizing Provider: TONNY MELISSA     Ordering User: LITA HENRDICKS   • glimepiride (AMARYL) 4 MG tablet 30 tablet 0     Sig: Take 1 tablet by mouth Every Morning Before Breakfast.     Authorizing Provider: TONNY MELISSA     Ordering User: LITA HENDRICKS   • omeprazole (PrilOSEC) 20 MG capsule 90 capsule 1     Sig: Take 1 capsule by mouth Every Morning Before Breakfast.     Authorizing Provider: TONNY MELISSA     Ordering User: LITA HENDRICKS   • rOPINIRole (Requip) 0.5 MG tablet 90 tablet 1     Sig: Take 1 tablet by mouth Every Night. Take 1 hour before bedtime.     Authorizing Provider: TONNY MELISSA     Ordering User: LITA HENDRICKS   • simethicone (Gas-X) 80 MG chewable tablet 90 tablet 1     Sig: Chew 1 tablet Every 6 (Six) Hours As Needed for Flatulence.     Authorizing Provider: TONNY MELISSA     Ordering User: LITA HENDRICKS      Last office visit with prescribing clinician: 2/7/2022      Next office visit with prescribing clinician: 3/15/2022            Lita Hendricks MA  03/02/22, 08:35 EST

## 2022-03-09 ENCOUNTER — TELEPHONE (OUTPATIENT)
Dept: FAMILY MEDICINE CLINIC | Facility: CLINIC | Age: 67
End: 2022-03-09

## 2022-03-09 DIAGNOSIS — G25.81 RLS (RESTLESS LEGS SYNDROME): ICD-10-CM

## 2022-03-09 DIAGNOSIS — I10 ESSENTIAL HYPERTENSION: ICD-10-CM

## 2022-03-09 DIAGNOSIS — R45.4 IRRITABILITY: ICD-10-CM

## 2022-03-09 DIAGNOSIS — E11.43 TYPE 2 DIABETES MELLITUS WITH DIABETIC AUTONOMIC NEUROPATHY, WITH LONG-TERM CURRENT USE OF INSULIN: ICD-10-CM

## 2022-03-09 DIAGNOSIS — R10.13 EPIGASTRIC PAIN: ICD-10-CM

## 2022-03-09 DIAGNOSIS — Z79.4 TYPE 2 DIABETES MELLITUS WITH DIABETIC AUTONOMIC NEUROPATHY, WITH LONG-TERM CURRENT USE OF INSULIN: ICD-10-CM

## 2022-03-09 RX ORDER — DULOXETIN HYDROCHLORIDE 60 MG/1
60 CAPSULE, DELAYED RELEASE ORAL DAILY
Status: CANCELLED | OUTPATIENT
Start: 2022-03-09

## 2022-03-09 RX ORDER — TRIAMTERENE AND HYDROCHLOROTHIAZIDE 37.5; 25 MG/1; MG/1
1 TABLET ORAL DAILY
Qty: 90 TABLET | Refills: 0 | OUTPATIENT
Start: 2022-03-09

## 2022-03-09 RX ORDER — ATORVASTATIN CALCIUM 40 MG/1
40 TABLET, FILM COATED ORAL DAILY
Status: CANCELLED | OUTPATIENT
Start: 2022-03-09

## 2022-03-09 RX ORDER — OMEPRAZOLE 20 MG/1
20 CAPSULE, DELAYED RELEASE ORAL
Qty: 90 CAPSULE | Refills: 1 | OUTPATIENT
Start: 2022-03-09

## 2022-03-09 RX ORDER — ROPINIROLE 0.5 MG/1
0.5 TABLET, FILM COATED ORAL NIGHTLY
Qty: 90 TABLET | Refills: 1 | OUTPATIENT
Start: 2022-03-09

## 2022-03-09 RX ORDER — GLIMEPIRIDE 4 MG/1
4 TABLET ORAL
Qty: 90 TABLET | Refills: 1 | Status: CANCELLED | OUTPATIENT
Start: 2022-03-09

## 2022-03-09 RX ORDER — BISOPROLOL FUMARATE 10 MG/1
10 TABLET, FILM COATED ORAL DAILY
Qty: 90 TABLET | Refills: 0 | OUTPATIENT
Start: 2022-03-09

## 2022-03-09 RX ORDER — ESCITALOPRAM OXALATE 10 MG/1
10 TABLET ORAL DAILY
Qty: 90 TABLET | Refills: 1 | Status: CANCELLED | OUTPATIENT
Start: 2022-03-09

## 2022-03-09 RX ORDER — LOSARTAN POTASSIUM 100 MG/1
100 TABLET ORAL DAILY
Status: CANCELLED | OUTPATIENT
Start: 2022-03-09

## 2022-03-09 NOTE — TELEPHONE ENCOUNTER
Rescheduled pt to 3/11/22 @11:15a , pt states she is still using her mothers medication, I informed her Christen said she CANNOT take her mothers medications, it can have interactions with her other medications since she is not prescribed them. Informed pt that if her sugar spikes she is to go to the ER so she can be prescribed her correct medications. Current readings today glucose 264, b/p 136/75.

## 2022-03-09 NOTE — TELEPHONE ENCOUNTER
Caller: Major Johnna Karolina    Relationship: Self    Best call back number: [phone]    Requested Prescriptions:   Requested Prescriptions     Pending Prescriptions Disp Refills   • glimepiride (AMARYL) 4 MG tablet 30 tablet 0     Sig: Take 1 tablet by mouth Every Morning Before Breakfast.   • bisoprolol (ZEBeta) 10 MG tablet 90 tablet 0     Sig: Take 1 tablet by mouth Daily.   • triamterene-hydrochlorothiazide (MAXZIDE-25) 37.5-25 MG per tablet 90 tablet 0     Sig: Take 1 tablet by mouth Daily.   • rOPINIRole (Requip) 0.5 MG tablet 90 tablet 1     Sig: Take 1 tablet by mouth Every Night. Take 1 hour before bedtime.   • DULoxetine (CYMBALTA) 60 MG capsule       Sig: Take 1 capsule by mouth Daily.   • losartan (COZAAR) 100 MG tablet       Sig: Take 1 tablet by mouth Daily.   • escitalopram (Lexapro) 10 MG tablet 30 tablet 5     Sig: Take 1 tablet by mouth Daily.   • omeprazole (PrilOSEC) 20 MG capsule 90 capsule 1     Sig: Take 1 capsule by mouth Every Morning Before Breakfast.   • atorvastatin (LIPITOR) 40 MG tablet       Sig: Take 1 tablet by mouth Daily.        Pharmacy where request should be sent: [facility] - [place], [place] - [address] AT PORTAL TO REGISTERED [facility] - [phone]  - [phone]     Does the patient have less than a 3 day supply:  [] Yes  [x] No    Jerry Green Rep   03/09/22 10:34 EST     ADDITIONAL REQUESTS:  PATIENT REQUESTED REFILLS OF   NOVOLOG 70/30 AND  JARDIANCE, UNSURE OF DOSAGE

## 2022-03-09 NOTE — TELEPHONE ENCOUNTER
Rx Refill Note  Requested Prescriptions     Pending Prescriptions Disp Refills   • glimepiride (AMARYL) 4 MG tablet 30 tablet 0     Sig: Take 1 tablet by mouth Every Morning Before Breakfast.   • bisoprolol (ZEBeta) 10 MG tablet 90 tablet 0     Sig: Take 1 tablet by mouth Daily.   • triamterene-hydrochlorothiazide (MAXZIDE-25) 37.5-25 MG per tablet 90 tablet 0     Sig: Take 1 tablet by mouth Daily.   • rOPINIRole (Requip) 0.5 MG tablet 90 tablet 1     Sig: Take 1 tablet by mouth Every Night. Take 1 hour before bedtime.   • DULoxetine (CYMBALTA) 60 MG capsule       Sig: Take 1 capsule by mouth Daily.   • losartan (COZAAR) 100 MG tablet       Sig: Take 1 tablet by mouth Daily.   • escitalopram (Lexapro) 10 MG tablet 30 tablet 5     Sig: Take 1 tablet by mouth Daily.   • omeprazole (PrilOSEC) 20 MG capsule 90 capsule 1     Sig: Take 1 capsule by mouth Every Morning Before Breakfast.   • atorvastatin (LIPITOR) 40 MG tablet       Sig: Take 1 tablet by mouth Daily.      Last office visit with prescribing clinician: 2/7/2022      Next office visit with prescribing clinician: 3/11/2022            Jn Bonilla MA  03/09/22, 11:42 EST     Losartan duloxetine atorvastatin has never been prescribed by you before

## 2022-03-09 NOTE — TELEPHONE ENCOUNTER
----- Message from Tracee Gaona PA-C sent at 3/8/2022  3:57 PM EST -----  Regarding: FW: Diabetes Medications  Please call patient and check on her.  She needs appointment in office to discuss her diabetes and medication noncompliance.  She CAN NOT take her mother's medication as this is not what has been prescribed for her and may interact with her other medications.      ----- Message -----  From: Shantal Pope RN  Sent: 3/8/2022   1:08 PM EST  To: Tracee Gaona PA-C  Subject: Diabetes Medications                             Hello,    I am a diabetes educator here at [facility]; I spoke with Ms. Johnna Gonsalves a few minutes ago for her 6 month follow up. She states she does not have her novolog insulin or her januvia because she cannot afford them. She states this morning she used her mother's levemir insulin, as her fasting blood sugar was 210. She rechecked it at 1130 today, it was 358, and she took 20 more u of levemir, since that is all she has. I cautioned her against taking medications that are not hers and advised her that levemir is a long-acting insulin. She is going to continue to monitor her blood sugar throughout the day. She gave me permission to send you a message and let you know. I know [facility]on at [facility] [place] is typically a more affordable option for many pts. Please let me know if you have any questions. Thank you.     JA Kay RN  My direct line is [phone]

## 2022-03-10 ENCOUNTER — EDUCATION (OUTPATIENT)
Dept: DIABETES SERVICES | Facility: HOSPITAL | Age: 67
End: 2022-03-10

## 2022-03-10 NOTE — CONSULTS
Attempted to call Ms. Gonsalves today as courtesy follow-up from our conversation on Tuesday. No answer at this time.

## 2022-03-11 ENCOUNTER — OFFICE VISIT (OUTPATIENT)
Dept: FAMILY MEDICINE CLINIC | Facility: CLINIC | Age: 67
End: 2022-03-11

## 2022-03-11 VITALS
OXYGEN SATURATION: 97 % | SYSTOLIC BLOOD PRESSURE: 118 MMHG | HEIGHT: 60 IN | TEMPERATURE: 97.3 F | HEART RATE: 80 BPM | BODY MASS INDEX: 42.21 KG/M2 | DIASTOLIC BLOOD PRESSURE: 76 MMHG | RESPIRATION RATE: 16 BRPM | WEIGHT: 215 LBS

## 2022-03-11 DIAGNOSIS — Z79.4 TYPE 2 DIABETES MELLITUS WITH DIABETIC AUTONOMIC NEUROPATHY, WITH LONG-TERM CURRENT USE OF INSULIN: Primary | ICD-10-CM

## 2022-03-11 DIAGNOSIS — I10 ESSENTIAL HYPERTENSION: ICD-10-CM

## 2022-03-11 DIAGNOSIS — E11.43 TYPE 2 DIABETES MELLITUS WITH DIABETIC AUTONOMIC NEUROPATHY, WITH LONG-TERM CURRENT USE OF INSULIN: Primary | ICD-10-CM

## 2022-03-11 PROCEDURE — 99214 OFFICE O/P EST MOD 30 MIN: CPT | Performed by: PHYSICIAN ASSISTANT

## 2022-03-11 RX ORDER — METFORMIN HYDROCHLORIDE 500 MG/1
500 TABLET, EXTENDED RELEASE ORAL
Qty: 90 TABLET | Refills: 0 | Status: SHIPPED | OUTPATIENT
Start: 2022-03-11 | End: 2022-03-15 | Stop reason: SDUPTHER

## 2022-03-11 RX ORDER — GLIMEPIRIDE 4 MG/1
4 TABLET ORAL
Qty: 90 TABLET | Refills: 0 | Status: SHIPPED | OUTPATIENT
Start: 2022-03-11 | End: 2022-04-08

## 2022-03-11 RX ORDER — INSULIN ASPART 100 [IU]/ML
100 INJECTION, SUSPENSION SUBCUTANEOUS
COMMUNITY
Start: 2022-02-07 | End: 2022-03-11

## 2022-03-11 NOTE — PATIENT INSTRUCTIONS
Tresiba is 20 units nightly.  Increase by 2 units every 3 days until fasting glucose is between 100-140.

## 2022-03-11 NOTE — PROGRESS NOTES
Chief Complaint   Patient presents with   • diabetes and medication noncompliance       HPI      Johnna Gonsalves is a 67 y.o. female who presents for diabetes and medication noncompliance.  Patient was unable to afford her NovoLog 70/30.  She has been taking her mother's Levemir as needed if blood sugar is high.  She has had some low readings after taking several units of the Levemir.  Her blood glucose has fluctuated all over the place.  She has not tried Metformin and is generally adverse to trying this due to side effects she witnessed her sister and mother experience.  She is on glimepiride 4 mg before breakfast.  She was unable to take Jardiance due to cost.  She has an upcoming appointment with Dr. Hill in April.    On repeat patient's blood pressure is normal at 130/78.  Her home readings show a significantly higher blood pressure.  She will bring in her cuff to her follow-up appointment and we will verify that it is accurate.  Denies headache or dizziness today.        Past Medical History:   Diagnosis Date   • Anemia    • Arthritis    • Carpal tunnel syndrome    • Cataract    • Chronic tension headaches    • Diabetes (HCC)    • Diabetic neuropathy (HCC)    • Dizziness    • Environmental allergies    • Fibromyalgia    • Fibromyalgia    • Fibromyalgia, primary    • Fractures    • Gall stones    • GERD (gastroesophageal reflux disease)    • Heart attack (HCC)    • Hypertension    • Irritable bowel syndrome    • Kidney infection    • Migraine headache    • Numbness and tingling    • Ovarian cyst    • Shingles    • Sleep apnea        Past Surgical History:   Procedure Laterality Date   • BIOPSY OF LEG     • CATARACT EXTRACTION     •  SECTION     • CHEST WALL BIOPSY     • CHOLECYSTECTOMY     • HYSTERECTOMY     • OOPHORECTOMY         Family History   Problem Relation Age of Onset   • Diabetes Mother    • Dementia Mother    • Heart disease Mother    • Hypertension Mother    •  "Hyperlipidemia Mother    • Kidney disease Mother    • Neuropathy Mother    • Obesity Mother    • Thyroid disease Mother    • Heart attack Mother    • Heart disease Father    • Lung cancer Father         smoker   • Heart attack Father    • Obesity Sister    • Breast cancer Maternal Aunt    • Lung cancer Maternal Aunt         smoker   • Colon cancer Neg Hx    • Ovarian cancer Neg Hx    • Cancer Neg Hx        Social History     Socioeconomic History   • Marital status:    Tobacco Use   • Smoking status: Never Smoker   • Smokeless tobacco: Never Used   Vaping Use   • Vaping Use: Never used   Substance and Sexual Activity   • Alcohol use: Never   • Drug use: Never   • Sexual activity: Not Currently     Partners: Male       Allergies   Allergen Reactions   • Sulfa Antibiotics Swelling       ROS    Review of Systems   Constitutional: Positive for fatigue. Negative for chills and fever.   Eyes: Negative for visual disturbance.   Respiratory: Negative for shortness of breath.    Cardiovascular: Negative for chest pain.   Endocrine: Negative for polydipsia, polyphagia and polyuria.   Neurological: Negative for dizziness and headache.       Vitals:    03/11/22 1124   BP: 118/76   Pulse: 80   Resp: 16   Temp: 97.3 °F (36.3 °C)   TempSrc: Infrared   SpO2: 97%   Weight: 97.5 kg (215 lb)   Height: 152.4 cm (60\")     Body mass index is 41.99 kg/m².    Current Outpatient Medications on File Prior to Visit   Medication Sig Dispense Refill   • aspirin 81 MG chewable tablet Chew 81 mg Daily.     • atorvastatin (LIPITOR) 40 MG tablet Take 40 mg by mouth Daily.     • bisoprolol (ZEBeta) 10 MG tablet Take 1 tablet by mouth Daily. 90 tablet 0   • Cholecalciferol (vitamin D3) 125 MCG (5000 UT) capsule capsule Take 5,000 Units by mouth Daily.     • Continuous Blood Gluc Sensor ([employer] Brianna Sensor System) Every 10 (Ten) Days. 3 each 11   • Cyanocobalamin (Vitamin B-12) 5000 MCG sublingual tablet Place  under the tongue.     • dry " mouth gel (BIOTENE ORALBALANCE) gel Apply  to the mouth or throat As Needed for Dry Mouth.     • DULoxetine (CYMBALTA) 60 MG capsule Take 60 mg by mouth Daily.     • escitalopram (Lexapro) 10 MG tablet Take 1 tablet by mouth Daily. 30 tablet 5   • Folic Acid (FOLATE PO) Take  by mouth.     • hydroxychloroquine (PLAQUENIL) 200 MG tablet Take 200 mg by mouth Daily.     • isosorbide dinitrate (ISORDIL) 30 MG tablet      • losartan (COZAAR) 100 MG tablet Take 100 mg by mouth Daily.     • meclizine (ANTIVERT) 25 MG tablet Take 25 mg by mouth 3 (Three) Times a Day As Needed for Dizziness.     • omeprazole (PrilOSEC) 20 MG capsule Take 1 capsule by mouth Every Morning Before Breakfast. 90 capsule 1   • ondansetron ODT (ZOFRAN-ODT) 4 MG disintegrating tablet 4 mg.     • rOPINIRole (Requip) 0.5 MG tablet Take 1 tablet by mouth Every Night. Take 1 hour before bedtime. 90 tablet 1   • simethicone (Gas-X) 80 MG chewable tablet Chew 1 tablet Every 6 (Six) Hours As Needed for Flatulence. 90 tablet 1   • triamterene-hydrochlorothiazide (MAXZIDE-25) 37.5-25 MG per tablet Take 1 tablet by mouth Daily. 90 tablet 0   • [DISCONTINUED] empagliflozin (Jardiance) 25 MG tablet tablet Take  by mouth Daily.     • [DISCONTINUED] glimepiride (AMARYL) 4 MG tablet Take 1 tablet by mouth Every Morning Before Breakfast. 30 tablet 0   • [DISCONTINUED] insulin aspart prot-insulin aspart (novoLOG 70/30) (70-30) 100 UNIT/ML injection Inject 100 Units under the skin into the appropriate area as directed.     • oxymetazoline (AFRIN) 0.05 % nasal spray 2 sprays into the nostril(s) as directed by provider 2 (Two) Times a Day.     • Sod Picosulfate-Mag Ox-Cit Acd 10-3.5-12 MG-GM -GM/160ML solution Take 1 kit by mouth Take As Directed. 320 mL 0     No current facility-administered medications on file prior to visit.       Results for orders placed or performed in visit on 02/07/22   POC Glycosylated Hemoglobin (Hb A1C)    Specimen: Blood   Result Value Ref  Range    Hemoglobin A1C 11.2 %    Lot Number [id number]     Expiration Date 9-28-23        PE    Physical Exam  Vitals reviewed.   Constitutional:       General: She is not in acute distress.     Appearance: Normal appearance. She is well-developed. She is morbidly obese. She is not ill-appearing or diaphoretic.   HENT:      Head: Normocephalic and atraumatic.   Eyes:      Extraocular Movements: Extraocular movements intact.      Conjunctiva/sclera: Conjunctivae normal.   Cardiovascular:      Rate and Rhythm: Normal rate and regular rhythm.      Heart sounds: Normal heart sounds.   Pulmonary:      Effort: No respiratory distress.   Musculoskeletal:         General: Normal range of motion.      Cervical back: Normal range of motion.      Right lower leg: No edema.      Left lower leg: No edema.   Skin:     General: Skin is warm.      Findings: No erythema or rash.   Neurological:      General: No focal deficit present.      Mental Status: She is alert.   Psychiatric:         Attention and Perception: She is attentive.         Mood and Affect: Mood normal.         Speech: Speech normal.         Behavior: Behavior normal. Behavior is cooperative.         Thought Content: Thought content normal.         Judgment: Judgment normal.          A/P    Diagnoses and all orders for this visit:    1. Type 2 diabetes mellitus with diabetic autonomic neuropathy, with long-term current use of insulin (HCC) (Primary)  -     insulin degludec (TRESIBA FLEXTOUCH) 100 UNIT/ML solution pen-injector injection; Inject 20 Units under the skin into the appropriate area as directed Every Night.  Dispense: 18 mL; Refill: 0  -     glimepiride (AMARYL) 4 MG tablet; Take 1 tablet by mouth Every Morning Before Breakfast.  Dispense: 90 tablet; Refill: 0  -     metFORMIN ER (GLUCOPHAGE-XR) 500 MG 24 hr tablet; Take 1 tablet by mouth Daily With Dinner.  Dispense: 90 tablet; Refill: 0  -     Insulin Glulisine 100 UNIT/ML solution pen-injector; Inject  0.1 mL under the skin into the appropriate area as directed 3 (Three) Times a Day Before Meals for 90 days.  Dispense: 27 mL; Refill: 0  Long discussion advising patient not to take her mother's medications.  Or to give her mother her medications.  Continue on glimepiride 4 mg 30 minutes prior to breakfast.  We will trial Metformin 500 mg at night with dinner.  Start on Tresiba 20 units nightly.  Monitor fasting glucose levels and keep blood sugar log.  Advised to increase her Tresiba by 2 units every 3 days if blood glucose is not under 140.  Return in 1 week.  Prescribed Apidra fast acting insulin to take prior to meals.  Patient to take 10 units prior to eating unless blood sugar is below 200.  She is aware she can reach out to the office at anytime with questions or concerns.  Keep appointment with endocrinology on 4/11.    2. Essential hypertension  On repeat blood pressure is stable well-controlled in their office.  Her home readings show an elevation.  Bring in blood pressure cuff to appointment next week to verify that it is accurate.  Continue on current medications.         Plan of care reviewed with patient at the conclusion of today's visit. Education was provided regarding diagnosis, management and any prescribed or recommended OTC medications.  Patient verbalizes understanding of and agreement with management plan.    Return in about 1 week (around 3/18/2022) for Recheck, DM/HTN.     Tracee Gaona PA-C    Answers for HPI/ROS submitted by the patient on 3/11/2022  What is the primary reason for your visit?: High Blood Pressure

## 2022-03-15 DIAGNOSIS — E11.43 TYPE 2 DIABETES MELLITUS WITH DIABETIC AUTONOMIC NEUROPATHY, WITH LONG-TERM CURRENT USE OF INSULIN: ICD-10-CM

## 2022-03-15 DIAGNOSIS — Z79.4 TYPE 2 DIABETES MELLITUS WITH DIABETIC AUTONOMIC NEUROPATHY, WITH LONG-TERM CURRENT USE OF INSULIN: Primary | ICD-10-CM

## 2022-03-15 DIAGNOSIS — E11.43 TYPE 2 DIABETES MELLITUS WITH DIABETIC AUTONOMIC NEUROPATHY, WITH LONG-TERM CURRENT USE OF INSULIN: Primary | ICD-10-CM

## 2022-03-15 DIAGNOSIS — Z79.4 TYPE 2 DIABETES MELLITUS WITH DIABETIC AUTONOMIC NEUROPATHY, WITH LONG-TERM CURRENT USE OF INSULIN: ICD-10-CM

## 2022-03-15 RX ORDER — METFORMIN HYDROCHLORIDE 500 MG/1
500 TABLET, EXTENDED RELEASE ORAL
Qty: 90 TABLET | Refills: 0 | Status: SHIPPED | OUTPATIENT
Start: 2022-03-15 | End: 2022-04-11 | Stop reason: SDUPTHER

## 2022-03-15 RX ORDER — INSULIN ASPART 100 [IU]/ML
10 INJECTION, SOLUTION INTRAVENOUS; SUBCUTANEOUS
Qty: 27 ML | Refills: 0 | Status: SHIPPED | OUTPATIENT
Start: 2022-03-15 | End: 2022-04-07 | Stop reason: SDUPTHER

## 2022-03-15 NOTE — TELEPHONE ENCOUNTER
Rx Refill Note  Requested Prescriptions     Pending Prescriptions Disp Refills   • metFORMIN ER (GLUCOPHAGE-XR) 500 MG 24 hr tablet 90 tablet 0     Sig: Take 1 tablet by mouth Daily With Dinner.      Last office visit with prescribing clinician: 3/11/2022      Next office visit with prescribing clinician: 3/21/2022            Anabel Fournier MA  03/15/22, 16:14 EDT

## 2022-03-21 ENCOUNTER — OFFICE VISIT (OUTPATIENT)
Dept: FAMILY MEDICINE CLINIC | Facility: CLINIC | Age: 67
End: 2022-03-21

## 2022-03-21 VITALS
SYSTOLIC BLOOD PRESSURE: 118 MMHG | DIASTOLIC BLOOD PRESSURE: 66 MMHG | WEIGHT: 215.4 LBS | OXYGEN SATURATION: 98 % | HEART RATE: 75 BPM | HEIGHT: 60 IN | BODY MASS INDEX: 42.29 KG/M2 | TEMPERATURE: 98.4 F

## 2022-03-21 DIAGNOSIS — I10 ESSENTIAL HYPERTENSION: ICD-10-CM

## 2022-03-21 DIAGNOSIS — E66.01 OBESITY, MORBID: ICD-10-CM

## 2022-03-21 DIAGNOSIS — E11.65 UNCONTROLLED TYPE 2 DIABETES MELLITUS WITH HYPERGLYCEMIA: Primary | ICD-10-CM

## 2022-03-21 PROCEDURE — 99214 OFFICE O/P EST MOD 30 MIN: CPT | Performed by: PHYSICIAN ASSISTANT

## 2022-03-22 NOTE — PROGRESS NOTES
Chief Complaint   Patient presents with   • Follow-up   • Hypertension       HPI     Johnna Gonsalves is a pleasant 67 y.o. female who is here for routine follow-up of hypertension and diabetes type 2.  Patient is compliant on all medications.  Repeat BP is 134/76.  Her home cuff has similar readings.  BP from home has been higher, usually in the 140s/90s.  She is asymptomatic.    She is taking diabetes medications as prescribed and brings in blood glucose log from home.  She is taking Tresiba 30 units nightly.  Will pick-up Novolog and start 10 units prior to each meal.  Fasting glucose is still running in high 200s.  She is taking metformin 500 mg nightly with dinner and tolerating well.  Will increase to 1000 in another week.  Compliant on glimiperide 4 mg prior to breakfast.  Discussed eating habits and she is avoiding sugary foods and beverages.    Recently established with [employer] and is very happy.    Past Medical History:   Diagnosis Date   • Anemia    • Arthritis    • Carpal tunnel syndrome    • Cataract    • Chronic tension headaches    • Diabetes (HCC)    • Diabetic neuropathy (HCC)    • Dizziness    • Environmental allergies    • Fibromyalgia    • Fibromyalgia    • Fibromyalgia, primary    • Fractures    • Gall stones    • GERD (gastroesophageal reflux disease)    • Heart attack (HCC)    • Hypertension    • Irritable bowel syndrome    • Kidney infection    • Migraine headache    • Numbness and tingling    • Ovarian cyst    • Shingles    • Sleep apnea        Past Surgical History:   Procedure Laterality Date   • BIOPSY OF LEG     • CATARACT EXTRACTION     •  SECTION     • CHEST WALL BIOPSY     • CHOLECYSTECTOMY     • HYSTERECTOMY     • OOPHORECTOMY         Family History   Problem Relation Age of Onset   • Diabetes Mother    • Dementia Mother    • Heart disease Mother    • Hypertension Mother    • Hyperlipidemia Mother    • Kidney disease Mother    • Neuropathy Mother    •  "Obesity Mother    • Thyroid disease Mother    • Heart attack Mother    • Heart disease Father    • Lung cancer Father         smoker   • Heart attack Father    • Obesity Sister    • Breast cancer Maternal Aunt    • Lung cancer Maternal Aunt         smoker   • Colon cancer Neg Hx    • Ovarian cancer Neg Hx    • Cancer Neg Hx        Social History     Socioeconomic History   • Marital status:    Tobacco Use   • Smoking status: Never Smoker   • Smokeless tobacco: Never Used   Vaping Use   • Vaping Use: Never used   Substance and Sexual Activity   • Alcohol use: Never   • Drug use: Never   • Sexual activity: Not Currently     Partners: Male       Allergies   Allergen Reactions   • Sulfa Antibiotics Swelling       ROS  Review of Systems   Constitutional: Positive for fatigue.   Eyes: Positive for blurred vision.   Respiratory: Negative for cough, shortness of breath and wheezing.    Cardiovascular: Negative for chest pain, palpitations and leg swelling.   Endocrine: Positive for polydipsia and polyuria.   Neurological: Positive for dizziness, tremors, weakness and confusion. Negative for headache.       Vitals:    03/21/22 1452   BP: 118/66   BP Location: Left arm   Patient Position: Sitting   Cuff Size: Large Adult   Pulse: 75   Temp: 98.4 °F (36.9 °C)   SpO2: 98%   Weight: 97.7 kg (215 lb 6.4 oz)   Height: 152.4 cm (60\")   PainSc: 0-No pain     Body mass index is 42.07 kg/m².    Current Outpatient Medications on File Prior to Visit   Medication Sig Dispense Refill   • aspirin 81 MG chewable tablet Chew 81 mg Daily.     • atorvastatin (LIPITOR) 40 MG tablet Take 40 mg by mouth Daily.     • bisoprolol (ZEBeta) 10 MG tablet Take 1 tablet by mouth Daily. 90 tablet 0   • Cholecalciferol (vitamin D3) 125 MCG (5000 UT) capsule capsule Take 5,000 Units by mouth Daily.     • Continuous Blood Gluc Sensor (voxapp Brianna Sensor System) Every 10 (Ten) Days. 3 each 11   • Cyanocobalamin (Vitamin B-12) 5000 MCG sublingual " tablet Place  under the tongue.     • dry mouth gel (BIOTENE ORALBALANCE) gel Apply  to the mouth or throat As Needed for Dry Mouth.     • DULoxetine (CYMBALTA) 60 MG capsule Take 60 mg by mouth Daily.     • escitalopram (Lexapro) 10 MG tablet Take 1 tablet by mouth Daily. 30 tablet 5   • Folic Acid (FOLATE PO) Take  by mouth.     • glimepiride (AMARYL) 4 MG tablet Take 1 tablet by mouth Every Morning Before Breakfast. 90 tablet 0   • hydroxychloroquine (PLAQUENIL) 200 MG tablet Take 200 mg by mouth Daily.     • insulin aspart (NovoLOG FlexPen) 100 UNIT/ML solution pen-injector sc pen Inject 10 Units under the skin into the appropriate area as directed 3 (Three) Times a Day With Meals for 90 days. 27 mL 0   • insulin degludec (TRESIBA FLEXTOUCH) 100 UNIT/ML solution pen-injector injection Inject 20 Units under the skin into the appropriate area as directed Every Night. 18 mL 0   • isosorbide dinitrate (ISORDIL) 30 MG tablet      • losartan (COZAAR) 100 MG tablet Take 100 mg by mouth Daily.     • meclizine (ANTIVERT) 25 MG tablet Take 25 mg by mouth 3 (Three) Times a Day As Needed for Dizziness.     • metFORMIN ER (GLUCOPHAGE-XR) 500 MG 24 hr tablet Take 1 tablet by mouth Daily With Dinner. 90 tablet 0   • omeprazole (PrilOSEC) 20 MG capsule Take 1 capsule by mouth Every Morning Before Breakfast. 90 capsule 1   • ondansetron ODT (ZOFRAN-ODT) 4 MG disintegrating tablet 4 mg.     • oxymetazoline (AFRIN) 0.05 % nasal spray 2 sprays into the nostril(s) as directed by provider 2 (Two) Times a Day.     • rOPINIRole (Requip) 0.5 MG tablet Take 1 tablet by mouth Every Night. Take 1 hour before bedtime. 90 tablet 1   • simethicone (Gas-X) 80 MG chewable tablet Chew 1 tablet Every 6 (Six) Hours As Needed for Flatulence. 90 tablet 1   • Sod Picosulfate-Mag Ox-Cit Acd 10-3.5-12 MG-GM -GM/160ML solution Take 1 kit by mouth Take As Directed. 320 mL 0   • triamterene-hydrochlorothiazide (MAXZIDE-25) 37.5-25 MG per tablet Take 1  tablet by mouth Daily. 90 tablet 0     No current facility-administered medications on file prior to visit.       Results for orders placed or performed in visit on 02/07/22   POC Glycosylated Hemoglobin (Hb A1C)    Specimen: Blood   Result Value Ref Range    Hemoglobin A1C 11.2 %    Lot Number [id number]     Expiration Date 9-28-23        PE    Physical Exam  Vitals reviewed.   Constitutional:       General: She is not in acute distress.     Appearance: Normal appearance. She is well-developed. She is morbidly obese. She is not ill-appearing or diaphoretic.   HENT:      Head: Normocephalic and atraumatic.   Eyes:      Extraocular Movements: Extraocular movements intact.      Conjunctiva/sclera: Conjunctivae normal.   Cardiovascular:      Rate and Rhythm: Normal rate and regular rhythm.      Heart sounds: Normal heart sounds.   Pulmonary:      Effort: No respiratory distress.   Musculoskeletal:         General: Normal range of motion.      Cervical back: Normal range of motion.      Right lower leg: No edema.      Left lower leg: No edema.   Skin:     General: Skin is warm.      Findings: No erythema or rash.   Neurological:      General: No focal deficit present.      Mental Status: She is alert.   Psychiatric:         Attention and Perception: She is attentive.         Mood and Affect: Mood normal.         Speech: Speech normal.         Behavior: Behavior normal. Behavior is cooperative.         Thought Content: Thought content normal.         Judgment: Judgment normal.         A/P    Diagnoses and all orders for this visit:    1. Uncontrolled type 2 diabetes mellitus with hyperglycemia (HCC) (Primary)  -     Ambulatory Referral for Diabetic Eye Exam-Ophthalmology  Established with diabetes education.  Has upcoming appointment with endocrinology on 4/11.  Compliant on Tresiba 30 units nightly.  Increasing by 2 units every 3 days.  Fasting glucose is in the high 200s at home.  Taking metformin 500 mg with dinner,  will increase to 1000 mg in 7 days.  Continue on Glimiperide 4 mg prior to breakfast.  Will  Novolog prescription and start taking 10 units prior to each meal.  On ARB/statin.  Will refer to ophthalmologist for eye exam.    2. Essential hypertension  Stable, well-controlled.  Compliant on medication.  Continue to monitor at home.  BP cuff reading is similar to manual in office when repeated.    3. Obesity, morbid (HCC)  Encouraged healthy eating and exercise.         Plan of care reviewed with patient at the conclusion of today's visit. Education was provided regarding diagnosis, management and any prescribed or recommended OTC medications.  Patient verbalizes understanding of and agreement with management plan.    No follow-ups on file.     Tracee Gaona PA-C  Answers for HPI/ROS submitted by the patient on 3/19/2022  What is the primary reason for your visit?: Diabetes

## 2022-03-29 ENCOUNTER — TELEPHONE (OUTPATIENT)
Dept: FAMILY MEDICINE CLINIC | Facility: CLINIC | Age: 67
End: 2022-03-29

## 2022-03-29 NOTE — TELEPHONE ENCOUNTER
Pharmacy Name:  [facility]    Pharmacy representative name: LORETTA    Pharmacy representative phone number: [phone]    What medication are you calling in regards to: FREESTYLE MONSTER SENSORS    What question does the pharmacy have: CLARIFYING THE INSTRUCTIONS    Who is the provider that prescribed the medication: SHIN    REFERENCE NO IS [id number]

## 2022-03-29 NOTE — TELEPHONE ENCOUNTER
Spoke with pharmacy to advise that the sensor has been approved with insurance, and provided verbal to change sig to every 14 days    They are sending a 90 days quantity with 3 refills

## 2022-04-04 ENCOUNTER — TELEPHONE (OUTPATIENT)
Dept: FAMILY MEDICINE CLINIC | Facility: CLINIC | Age: 67
End: 2022-04-04

## 2022-04-04 NOTE — TELEPHONE ENCOUNTER
Attempted to contact, no answer. Unable to speak with representative.     Contacted patient further. She reports that she is unable to  her supplies because they are charging her 20% copay    She reports that their is a brand for a continuous monitor that is covered by insurance. I advised her to retrieve specific information about which brand is fully covered and relay to our office.

## 2022-04-04 NOTE — TELEPHONE ENCOUNTER
Caller: JAMES    Relationship:     [facility]  PRIOR AUTHORIZATION PROVIDER PHONE NUMBER:  [phone]   REFERENCE:  [id number]    Best call back number:   What is the best time to reach you: NA    Who are you requesting to speak with (clinical staff, provider,  specific staff member): CLINICAL    Do you know the name of the person who called: NA    What was the call regarding: JAMES @ [facility] STATED THEY HAVE NOT RECEIVED THE PRIOR AUTHORIZATION FOR PATIENT FREE STYLE BRIANNA      Lita Hendricks MA  to Johnna Gonsalves      8:48 AM  Rodrigo Mrs. Gonsalves. Our office completed a prior authorization for your Freestyle Brianna supplies lat month and we received an approval for that request. I have attached the determination letter from your insurance, in case you need to show that to the pharmacist.   Attachments    Auth Determination, [facility], 3/2/22     Last read by Johnna Gonsalves at 11:47 AM on 4/4/2022.      Do you require a callback: YES

## 2022-04-07 ENCOUNTER — TELEPHONE (OUTPATIENT)
Dept: FAMILY MEDICINE CLINIC | Facility: CLINIC | Age: 67
End: 2022-04-07

## 2022-04-07 DIAGNOSIS — Z79.4 TYPE 2 DIABETES MELLITUS WITH DIABETIC AUTONOMIC NEUROPATHY, WITH LONG-TERM CURRENT USE OF INSULIN: ICD-10-CM

## 2022-04-07 DIAGNOSIS — E11.43 TYPE 2 DIABETES MELLITUS WITH DIABETIC AUTONOMIC NEUROPATHY, WITH LONG-TERM CURRENT USE OF INSULIN: ICD-10-CM

## 2022-04-07 RX ORDER — INSULIN ASPART 100 [IU]/ML
10 INJECTION, SOLUTION INTRAVENOUS; SUBCUTANEOUS
Qty: 9 ML | Refills: 2 | Status: SHIPPED | OUTPATIENT
Start: 2022-04-07 | End: 2022-06-20

## 2022-04-07 NOTE — TELEPHONE ENCOUNTER
Spoke with patient.  She has not been taking her Tresiba every day.  She is taking this every 3 days.  She was confused.  Will start taking every night.  She has not picked up novolog because it was too expensive and had to pay for 3 month up front with mail order.  Sent 30 day to [facility].  Patient has appointment on Monday with Dr. Hill.

## 2022-04-07 NOTE — TELEPHONE ENCOUNTER
The patient is calling due to being seen in the ER in [place], her blood sugar was at 509 and they gave her fluids and did blood work and told her that it was at 241 and they released her. The patient checked it last night at 9:30 it was at 291. Today her blood sugar was checked at 8:30 and it was 251 and 12:00 351. She wants to know can she take another 40 unit shot of tresiba or do you want her to waiet? She she taken all of her medications today of metformin and glimepiride.

## 2022-04-08 RX ORDER — GLIMEPIRIDE 4 MG/1
TABLET ORAL
Qty: 30 TABLET | Refills: 0 | Status: SHIPPED | OUTPATIENT
Start: 2022-04-08 | End: 2022-06-22 | Stop reason: SDUPTHER

## 2022-04-08 NOTE — TELEPHONE ENCOUNTER
Rx Refill Note  Requested Prescriptions     Pending Prescriptions Disp Refills   • glimepiride (AMARYL) 4 MG tablet [Pharmacy Med Name: GLIMEPIRIDE  TAB 4MG] 30 tablet 0     Sig: TAKE 1 TABLET EVERY MORNINGBEFORE BREAKFAST      Last office visit with prescribing clinician: 3/21/2022      Next office visit with prescribing clinician: 6/13/2022            Anika Chavis MA  04/08/22, 09:01 EDT

## 2022-04-11 ENCOUNTER — OFFICE VISIT (OUTPATIENT)
Dept: ENDOCRINOLOGY | Facility: CLINIC | Age: 67
End: 2022-04-11

## 2022-04-11 VITALS
HEIGHT: 60 IN | HEART RATE: 91 BPM | BODY MASS INDEX: 42.21 KG/M2 | WEIGHT: 215 LBS | DIASTOLIC BLOOD PRESSURE: 77 MMHG | OXYGEN SATURATION: 97 % | SYSTOLIC BLOOD PRESSURE: 122 MMHG

## 2022-04-11 DIAGNOSIS — Z79.4 TYPE 2 DIABETES MELLITUS WITH DIABETIC AUTONOMIC NEUROPATHY, WITH LONG-TERM CURRENT USE OF INSULIN: ICD-10-CM

## 2022-04-11 DIAGNOSIS — E11.43 TYPE 2 DIABETES MELLITUS WITH DIABETIC AUTONOMIC NEUROPATHY, WITH LONG-TERM CURRENT USE OF INSULIN: ICD-10-CM

## 2022-04-11 DIAGNOSIS — E11.65 UNCONTROLLED TYPE 2 DIABETES MELLITUS WITH HYPERGLYCEMIA: Primary | ICD-10-CM

## 2022-04-11 LAB
EXPIRATION DATE: ABNORMAL
EXPIRATION DATE: NORMAL
GLUCOSE BLDC GLUCOMTR-MCNC: 367 MG/DL (ref 70–130)
HBA1C MFR BLD: 12.3 %
Lab: ABNORMAL
Lab: NORMAL

## 2022-04-11 PROCEDURE — 3046F HEMOGLOBIN A1C LEVEL >9.0%: CPT | Performed by: INTERNAL MEDICINE

## 2022-04-11 PROCEDURE — 99204 OFFICE O/P NEW MOD 45 MIN: CPT | Performed by: INTERNAL MEDICINE

## 2022-04-11 PROCEDURE — 82947 ASSAY GLUCOSE BLOOD QUANT: CPT | Performed by: INTERNAL MEDICINE

## 2022-04-11 RX ORDER — METFORMIN HYDROCHLORIDE 500 MG/1
1000 TABLET, EXTENDED RELEASE ORAL
Qty: 180 TABLET | Refills: 3 | Status: SHIPPED | OUTPATIENT
Start: 2022-04-11 | End: 2022-06-22 | Stop reason: SDUPTHER

## 2022-04-11 RX ORDER — SUB-Q INSULIN DEVICE, 40 UNIT
EACH MISCELLANEOUS
Qty: 90 EACH | Refills: 3 | Status: SHIPPED | OUTPATIENT
Start: 2022-04-11 | End: 2022-07-29

## 2022-04-11 NOTE — PROGRESS NOTES
"     Office Note      Date: 2022  Patient Name: Johnna Gonsalves  MRN: [id number]  : 1955    Chief Complaint   Patient presents with   • Diabetes       History of Present Illness:   Johnna Gonsalves is a 67 y.o. female who presents for Diabetes - type 2  She is seen today as a new patient   ----------------------------------------------  She was diagnosed about 5 years ago.  She  Initially declined to take metformin but eventually relented   She is currently taking metformin and sfu. She was prescribed jardiance but it is too expensive   She had been  prescribed 70/30 - 250 units per day.. but that was stopped and  She was switched to tresiba- now on 30 units per day.  bg checks are done 3 times per day.   she wanted to get a amilcar but it was too costly.  Her doctor wanted her to get novolog to take at each  Meal but it was too expensive to pay for both novolog and tresiba.  She was unable to tolerate victoza          Last A1c:  Hemoglobin A1C   Date Value Ref Range Status   2022 12.3 % Final       Subjective      Diabetic Complications:  Eyes: No  Kidneys: No  Feet: Yes, describe: symptomatic neuropathy   Heart: Yes, describe: MI. No stent or bypass surgery     Diet and Exercise:  Meals per day: 2  Minutes of exercise per week: not much  Review of Systems:   Review of Systems   Constitutional: Positive for appetite change and fatigue.   Eyes: Positive for visual disturbance.   Endocrine: Positive for polydipsia and polyuria.       The following portions of the patient's history were reviewed and updated as appropriate: allergies, current medications, past family history, past medical history, past social history, past surgical history and problem list.    Objective     Visit Vitals  /77   Pulse 91   Ht 152.4 cm (60\")   Wt 97.5 kg (215 lb)   SpO2 97%   BMI 41.99 kg/m²       Labs:    CMP  Lab Results   Component Value Date    GLUCOSE 192 (H) 2021    BUN 13 2021    " CREATININE 0.80 10/27/2021    EGFRIFNONA 78 07/28/2021    EGFRIFAFRI 94 07/28/2021    BCR 17.3 07/28/2021    K 3.6 07/28/2021    CO2 26.6 07/28/2021    CALCIUM 9.1 07/28/2021    PROTENTOTREF 7.2 07/28/2021    LABIL2 1.5 07/28/2021    AST 21 07/28/2021    ALT 19 07/28/2021        CBC w/DIFF  Lab Results   Component Value Date    WBC 8.39 07/28/2021    RBC 4.90 07/28/2021    HGB 15.3 07/28/2021    HCT 44.8 07/28/2021    MCV 91.4 07/28/2021    MCH 31.2 07/28/2021    MCHC 34.2 07/28/2021    RDW 12.9 07/28/2021     07/28/2021    NEUTRORELPCT 64.0 07/28/2021    LYMPHORELPCT 28.1 07/28/2021    MONORELPCT 6.0 07/28/2021    EOSRELPCT 1.2 07/28/2021    BASORELPCT 0.6 07/28/2021    NEUTROABS 5.37 07/28/2021    LYMPHSABS 2.36 07/28/2021    MONOSABS 0.50 07/28/2021    EOSABS 0.10 07/28/2021    BASOSABS 0.05 07/28/2021    NRBC 0.0 07/28/2021       Physical Exam:  Physical Exam  Constitutional:       Appearance: Normal appearance.   HENT:      Head: Normocephalic and atraumatic.   Eyes:      Extraocular Movements: Extraocular movements intact.   Neck:      Comments: No goiter  Cardiovascular:      Pulses:           Dorsalis pedis pulses are 1+ on the right side and 1+ on the left side.        Posterior tibial pulses are 1+ on the right side and 1+ on the left side.   Feet:      Right foot:      Protective Sensation: 10 sites tested. 6 sites sensed.      Skin integrity: Skin integrity normal.      Toenail Condition: Right toenails are abnormally thick.      Left foot:      Protective Sensation: 10 sites tested. 6 sites sensed.      Skin integrity: Skin integrity normal.      Toenail Condition: Left toenails are abnormally thick.      Comments: Diabetic Foot Exam Performed and Monofilament Test Performed  Lymphadenopathy:      Cervical: No cervical adenopathy.   Neurological:      Mental Status: She is alert.   Psychiatric:         Mood and Affect: Mood normal.         Thought Content: Thought content normal.          Judgment: Judgment normal.         Assessment / Plan      Assessment & Plan:  Problem List Items Addressed This Visit        Other    Type 2 diabetes mellitus with diabetic autonomic neuropathy, with long-term current use of insulin (HCC)    Relevant Medications    Continuous Blood Gluc Sensor (FreeStyle Brianna Sensor System)    insulin degludec (TRESIBA FLEXTOUCH) 100 UNIT/ML solution pen-injector injection    insulin aspart (NovoLOG FlexPen) 100 UNIT/ML solution pen-injector sc pen    glimepiride (AMARYL) 4 MG tablet    metFORMIN ER (GLUCOPHAGE-XR) 500 MG 24 hr tablet    Uncontrolled type 2 diabetes mellitus with hyperglycemia (HCC) - Primary    Current Assessment & Plan     Extremely hyperglycemic.  Unable to afford sglt2  Cannot tolerate glp1  Cost of insulin a barrier to care.  Cannot really afford 2 kinds of insulin    Rec: diet and exercise guidelines reviewed in detail with patient           Explored the possibility of bariatric surgery            Will increase metformin           She will use up her tresiba and then transition to using short acting insulin in a vgo device.            Relevant Medications    insulin degludec (TRESIBA FLEXTOUCH) 100 UNIT/ML solution pen-injector injection    insulin aspart (NovoLOG FlexPen) 100 UNIT/ML solution pen-injector sc pen    glimepiride (AMARYL) 4 MG tablet    metFORMIN ER (GLUCOPHAGE-XR) 500 MG 24 hr tablet    Other Relevant Orders    POC Glucose, Blood (Completed)    POC Glycosylated Hemoglobin (Hb A1C) (Completed)           Akin Hill MD   04/11/2022

## 2022-04-11 NOTE — ASSESSMENT & PLAN NOTE
Extremely hyperglycemic.  Unable to afford sglt2  Cannot tolerate glp1  Cost of insulin a barrier to care.  Cannot really afford 2 kinds of insulin    Rec: diet and exercise guidelines reviewed in detail with patient           Explored the possibility of bariatric surgery            Will increase metformin           She will use up her tresiba and then transition to using short acting insulin in a vgo device.

## 2022-04-15 ENCOUNTER — TELEPHONE (OUTPATIENT)
Dept: ENDOCRINOLOGY | Facility: CLINIC | Age: 67
End: 2022-04-15

## 2022-04-15 ENCOUNTER — PRIOR AUTHORIZATION (OUTPATIENT)
Dept: FAMILY MEDICINE CLINIC | Facility: CLINIC | Age: 67
End: 2022-04-15

## 2022-04-21 ENCOUNTER — OFFICE VISIT (OUTPATIENT)
Dept: SLEEP MEDICINE | Facility: HOSPITAL | Age: 67
End: 2022-04-21

## 2022-04-21 VITALS
SYSTOLIC BLOOD PRESSURE: 128 MMHG | BODY MASS INDEX: 42.8 KG/M2 | WEIGHT: 218 LBS | DIASTOLIC BLOOD PRESSURE: 70 MMHG | HEIGHT: 60 IN | OXYGEN SATURATION: 96 % | HEART RATE: 76 BPM

## 2022-04-21 DIAGNOSIS — G47.19 EXCESSIVE DAYTIME SLEEPINESS: ICD-10-CM

## 2022-04-21 DIAGNOSIS — E66.01 OBESITY, MORBID, BMI 40.0-49.9: Primary | ICD-10-CM

## 2022-04-21 DIAGNOSIS — G25.81 RLS (RESTLESS LEGS SYNDROME): ICD-10-CM

## 2022-04-21 DIAGNOSIS — G47.33 SEVERE OBSTRUCTIVE SLEEP APNEA: ICD-10-CM

## 2022-04-21 PROCEDURE — 99203 OFFICE O/P NEW LOW 30 MIN: CPT | Performed by: INTERNAL MEDICINE

## 2022-04-21 NOTE — PROGRESS NOTES
Johnna Gonsalves is a 67 y.o. female.   Chief Complaint   Patient presents with   • Sleeping Problem       HPI     67 y.o. female seen in consultation at the request of Tracee Gaona,* for evaluation of the above.     She has a history of obstructive sleep apnea diagnosed in 2018 in [place].  Her machine has stopped functioning and she is here for further evaluation.    Without her machine she has excessive daytime sleepiness and frequent awakenings at night.  She has snoring and witnessed apneas.  She averages 6 hours of sleep per night and also naps during the day for up to 2 or 3 hours.    She had a sleep study in April 2018.  This was a home study and revealed an AHI of 34.  This was in [place].  She has subsequent titration on 6/29 of that year revealing an optimal CPAP pressure of 11.  She was placed on auto CPAP therapy to 10-20.  She historically has used a nasal pillow mask.    She also has a history of RLS treated with 0.5 mg of Requip nightly.    Charlotte Scale is: 19/24    The patient's relevant past medical, surgical, family, and social history reviewed and updated in Epic as appropriate.    Current medications are:   Current Outpatient Medications:   •  aspirin 81 MG chewable tablet, Chew 81 mg Daily., Disp: , Rfl:   •  atorvastatin (LIPITOR) 40 MG tablet, Take 40 mg by mouth Daily., Disp: , Rfl:   •  bisoprolol (ZEBeta) 10 MG tablet, Take 1 tablet by mouth Daily., Disp: 90 tablet, Rfl: 0  •  Cholecalciferol (vitamin D3) 125 MCG (5000 UT) capsule capsule, Take 5,000 Units by mouth Daily., Disp: , Rfl:   •  Continuous Blood Gluc Sensor (FreeStyle Brianna Sensor System), Every 10 (Ten) Days., Disp: 3 each, Rfl: 11  •  Cyanocobalamin (Vitamin B-12) 5000 MCG sublingual tablet, Place  under the tongue., Disp: , Rfl:   •  dry mouth gel (BIOTENE ORALBALANCE) gel, Apply  to the mouth or throat As Needed for Dry Mouth., Disp: , Rfl:   •  DULoxetine (CYMBALTA) 60 MG capsule, Take 60 mg by mouth  Daily., Disp: , Rfl:   •  escitalopram (Lexapro) 10 MG tablet, Take 1 tablet by mouth Daily., Disp: 30 tablet, Rfl: 5  •  Folic Acid (FOLATE PO), Take  by mouth., Disp: , Rfl:   •  glimepiride (AMARYL) 4 MG tablet, TAKE 1 TABLET EVERY MORNINGBEFORE BREAKFAST, Disp: 30 tablet, Rfl: 0  •  hydroxychloroquine (PLAQUENIL) 200 MG tablet, Take 200 mg by mouth Daily., Disp: , Rfl:   •  insulin aspart (NovoLOG FlexPen) 100 UNIT/ML solution pen-injector sc pen, Inject 10 Units under the skin into the appropriate area as directed 3 (Three) Times a Day With Meals for 90 days., Disp: 9 mL, Rfl: 2  •  insulin degludec (TRESIBA FLEXTOUCH) 100 UNIT/ML solution pen-injector injection, Inject 20 Units under the skin into the appropriate area as directed Every Night., Disp: 18 mL, Rfl: 0  •  Insulin Disposable Pump (V-Go 40) kit, Use daily, Disp: 90 each, Rfl: 3  •  isosorbide dinitrate (ISORDIL) 30 MG tablet, , Disp: , Rfl:   •  losartan (COZAAR) 100 MG tablet, Take 100 mg by mouth Daily., Disp: , Rfl:   •  meclizine (ANTIVERT) 25 MG tablet, Take 25 mg by mouth 3 (Three) Times a Day As Needed for Dizziness., Disp: , Rfl:   •  metFORMIN ER (GLUCOPHAGE-XR) 500 MG 24 hr tablet, Take 2 tablets by mouth Daily With Dinner., Disp: 180 tablet, Rfl: 3  •  omeprazole (PrilOSEC) 20 MG capsule, Take 1 capsule by mouth Every Morning Before Breakfast., Disp: 90 capsule, Rfl: 1  •  ondansetron ODT (ZOFRAN-ODT) 4 MG disintegrating tablet, 4 mg., Disp: , Rfl:   •  oxymetazoline (AFRIN) 0.05 % nasal spray, 2 sprays into the nostril(s) as directed by provider 2 (Two) Times a Day., Disp: , Rfl:   •  rOPINIRole (Requip) 0.5 MG tablet, Take 1 tablet by mouth Every Night. Take 1 hour before bedtime., Disp: 90 tablet, Rfl: 1  •  simethicone (Gas-X) 80 MG chewable tablet, Chew 1 tablet Every 6 (Six) Hours As Needed for Flatulence., Disp: 90 tablet, Rfl: 1  •  Sod Picosulfate-Mag Ox-Cit Acd 10-3.5-12 MG-GM -GM/160ML solution, Take 1 kit by mouth Take As  "Directed., Disp: 320 mL, Rfl: 0  •  triamterene-hydrochlorothiazide (MAXZIDE-25) 37.5-25 MG per tablet, Take 1 tablet by mouth Daily., Disp: 90 tablet, Rfl: 0.    Review of Systems    Review of Systems  ROS documented in patient questionnaire ×14 systems.  Reviewed with patient.  Otherwise negative except as noted in HPI.    Physical Exam    Blood pressure 128/70, pulse 76, height 152.4 cm (60\"), weight 98.9 kg (218 lb), SpO2 96 %, not currently breastfeeding. Body mass index is 42.58 kg/m².    Physical Exam  Vitals and nursing note reviewed.   Constitutional:       Appearance: Normal appearance. She is well-developed.   HENT:      Head: Normocephalic and atraumatic.      Nose: Nose normal.      Mouth/Throat:      Mouth: Mucous membranes are moist.      Pharynx: Oropharynx is clear. No oropharyngeal exudate.      Comments: Class III airway  Eyes:      General: No scleral icterus.     Conjunctiva/sclera: Conjunctivae normal.   Neck:      Thyroid: No thyromegaly.      Trachea: No tracheal deviation.   Cardiovascular:      Rate and Rhythm: Normal rate and regular rhythm.      Heart sounds: No murmur heard.    No friction rub. No gallop.   Pulmonary:      Effort: Pulmonary effort is normal. No respiratory distress.      Breath sounds: No wheezing or rales.   Musculoskeletal:         General: No deformity. Normal range of motion.   Skin:     General: Skin is warm and dry.      Findings: No rash.   Neurological:      Mental Status: She is alert and oriented to person, place, and time.   Psychiatric:         Behavior: Behavior normal.         Thought Content: Thought content normal.         DATA:    Reviewed HSAT dated 4/18/2018 as described above    Reviewed PSG titration dated 6/29/2021 as described above    ASSESSMENT:    Problem List Items Addressed This Visit        Pulmonary Problems    Severe obstructive sleep apnea    Overview     AHI 34 4/2018              Other    RLS (restless legs syndrome)    Obesity, morbid, " BMI 40.0-49.9 (HCC) - Primary    Excessive daytime sleepiness          67-year-old female with a history of severe obstructive sleep apnea diagnosed in [place] in 2018.  Details are as above.  I have reviewed her previous home study and her PSG titration.  She has been treated historically with auto CPAP 10-20 cm H2O but her machine has stopped functioning and she is having increasing symptoms of daytime sleepiness and poor sleep quality.  She also history of RLS treated by her primary care provider with Requip.    PLAN:    1. I do not have a clinical need for a repeat study and feel comfortable with reordering her auto CPAP 10-20 cm H2O with a follow-up to assess her clinical response and download.  2. If there is any question regarding efficacy after that she could have a titration study.  3. Close sleep center follow-up to assess efficacy and compliance with CPAP therapy    I have reviewed the results of my evaluation and impression and discussed my recommendations in detail with the patient.    Level of Risk Moderate due to: mild exacerbation of one chronic illness    Signed by  Lenin Anne MD    April 21, 2022      CC: Tracee Gaona, Tracee Crawley,*           No risk alerts present

## 2022-05-24 DIAGNOSIS — I10 ESSENTIAL HYPERTENSION: ICD-10-CM

## 2022-05-24 DIAGNOSIS — R45.4 IRRITABILITY: ICD-10-CM

## 2022-05-24 RX ORDER — TRIAMTERENE AND HYDROCHLOROTHIAZIDE 37.5; 25 MG/1; MG/1
TABLET ORAL
Qty: 90 TABLET | Refills: 0 | Status: SHIPPED | OUTPATIENT
Start: 2022-05-24 | End: 2022-06-28 | Stop reason: SDUPTHER

## 2022-05-24 RX ORDER — BISOPROLOL FUMARATE 10 MG/1
TABLET, FILM COATED ORAL
Qty: 90 TABLET | Refills: 0 | Status: SHIPPED | OUTPATIENT
Start: 2022-05-24 | End: 2022-06-28 | Stop reason: SDUPTHER

## 2022-05-24 RX ORDER — ESCITALOPRAM OXALATE 10 MG/1
TABLET ORAL
Qty: 90 TABLET | Refills: 0 | Status: SHIPPED | OUTPATIENT
Start: 2022-05-24 | End: 2022-09-13

## 2022-05-24 NOTE — TELEPHONE ENCOUNTER
Rx Refill Note  Requested Prescriptions     Pending Prescriptions Disp Refills   • triamterene-hydrochlorothiazide (MAXZIDE-25) 37.5-25 MG per tablet [Pharmacy Med Name: Triamterene-HCTZ 37.5-25 MG Oral Tablet] 90 tablet 0     Sig: Take 1 tablet by mouth once daily   • bisoprolol (ZEBeta) 10 MG tablet [Pharmacy Med Name: Bisoprolol Fumarate 10 MG Oral Tablet] 90 tablet 0     Sig: Take 1 tablet by mouth once daily   • escitalopram (LEXAPRO) 10 MG tablet [Pharmacy Med Name: Escitalopram Oxalate 10 MG Oral Tablet] 90 tablet 0     Sig: Take 1 tablet by mouth once daily      Last office visit with prescribing clinician: 3/21/2022      Next office visit with prescribing clinician: 6/20/2022   Sharon Bernal MA  05/24/22, 13:40 EDT     Last fill: 03/02/2022

## 2022-06-20 ENCOUNTER — OFFICE VISIT (OUTPATIENT)
Dept: FAMILY MEDICINE CLINIC | Facility: CLINIC | Age: 67
End: 2022-06-20

## 2022-06-20 VITALS
HEART RATE: 77 BPM | WEIGHT: 216.4 LBS | BODY MASS INDEX: 42.49 KG/M2 | SYSTOLIC BLOOD PRESSURE: 126 MMHG | OXYGEN SATURATION: 97 % | DIASTOLIC BLOOD PRESSURE: 72 MMHG | HEIGHT: 60 IN

## 2022-06-20 DIAGNOSIS — E11.65 UNCONTROLLED TYPE 2 DIABETES MELLITUS WITH HYPERGLYCEMIA: ICD-10-CM

## 2022-06-20 DIAGNOSIS — I10 ESSENTIAL HYPERTENSION: Primary | ICD-10-CM

## 2022-06-20 LAB
EXPIRATION DATE: NORMAL
HBA1C MFR BLD: 12 %
Lab: NORMAL

## 2022-06-20 PROCEDURE — 83036 HEMOGLOBIN GLYCOSYLATED A1C: CPT | Performed by: PHYSICIAN ASSISTANT

## 2022-06-20 PROCEDURE — 3046F HEMOGLOBIN A1C LEVEL >9.0%: CPT | Performed by: PHYSICIAN ASSISTANT

## 2022-06-20 PROCEDURE — 99214 OFFICE O/P EST MOD 30 MIN: CPT | Performed by: PHYSICIAN ASSISTANT

## 2022-06-20 RX ORDER — PEN NEEDLE, DIABETIC 32 GX 1/6"
NEEDLE, DISPOSABLE MISCELLANEOUS
Qty: 90 EACH | Refills: 3 | Status: SHIPPED | OUTPATIENT
Start: 2022-06-20 | End: 2022-07-29

## 2022-06-20 NOTE — PROGRESS NOTES
Chief Complaint   Patient presents with   • Diabetes     3 month follow-up       HPI     Johnna Gonsalves is a pleasant 67 y.o. female who is here for routine follow-up of diabetes type 2 and hypertension.  Patient has established with endocrinology.  She is compliant on the medications they have prescribed.  There was an insurance issue and she was unable to get some of the medications they wanted her to try.  She will contact their office regarding this.    She has had episodes of memory fog, arthralgias and falls.    Past Medical History:   Diagnosis Date   • Anemia    • Arthritis    • Carpal tunnel syndrome    • Cataract    • Chronic tension headaches    • Diabetes (HCC)    • Diabetic neuropathy (HCC)    • Dizziness    • Environmental allergies    • Fibromyalgia    • Fibromyalgia    • Fibromyalgia, primary    • Fractures    • Gall stones    • GERD (gastroesophageal reflux disease)    • Heart attack (HCC)    • Hypertension    • Irritable bowel syndrome    • Kidney infection    • Migraine headache    • Numbness and tingling    • Ovarian cyst    • Shingles    • Sleep apnea        Past Surgical History:   Procedure Laterality Date   • BIOPSY OF LEG     • CATARACT EXTRACTION     •  SECTION     • CHEST WALL BIOPSY     • CHOLECYSTECTOMY     • HYSTERECTOMY     • OOPHORECTOMY         Family History   Problem Relation Age of Onset   • Diabetes Mother    • Dementia Mother    • Heart disease Mother    • Hypertension Mother    • Hyperlipidemia Mother    • Kidney disease Mother    • Neuropathy Mother    • Obesity Mother    • Thyroid disease Mother    • Heart attack Mother    • Heart disease Father    • Lung cancer Father         smoker   • Heart attack Father    • Cancer Father    • Vision loss Father         Hystoplasmosis   • Obesity Sister    • Cancer Sister    • Vision loss Sister         Born with eye problems. Born with left eye being weak   • Breast cancer Maternal Aunt    • Lung cancer Maternal  "Aunt         smoker   • Colon cancer Neg Hx    • Ovarian cancer Neg Hx        Social History     Socioeconomic History   • Marital status:    Tobacco Use   • Smoking status: Never Smoker   • Smokeless tobacco: Never Used   Vaping Use   • Vaping Use: Never used   Substance and Sexual Activity   • Alcohol use: Never   • Drug use: Never   • Sexual activity: Not Currently     Partners: Male       Allergies   Allergen Reactions   • Sulfa Antibiotics Swelling       ROS  Review of Systems   Constitutional: Positive for fatigue. Negative for chills and fever.   Eyes: Positive for visual disturbance.   Respiratory: Negative for cough, shortness of breath and wheezing.    Cardiovascular: Negative for chest pain, palpitations and leg swelling.   Endocrine: Positive for polyuria.   Musculoskeletal: Positive for arthralgias and myalgias. Negative for gait problem.   Neurological: Positive for weakness.       Vitals:    06/20/22 1304   BP: 126/72   Pulse: 77   SpO2: 97%   Weight: 98.2 kg (216 lb 6.4 oz)   Height: 152.4 cm (60\")   PainSc: 0-No pain     Body mass index is 42.26 kg/m².    Current Outpatient Medications on File Prior to Visit   Medication Sig Dispense Refill   • aspirin 81 MG chewable tablet Chew 81 mg Daily.     • atorvastatin (LIPITOR) 40 MG tablet Take 40 mg by mouth Daily.     • bisoprolol (ZEBeta) 10 MG tablet Take 1 tablet by mouth once daily 90 tablet 0   • Cholecalciferol (vitamin D3) 125 MCG (5000 UT) capsule capsule Take 5,000 Units by mouth Daily.     • Continuous Blood Gluc Sensor (FreeStyle Brianna Sensor System) Every 10 (Ten) Days. 3 each 11   • Cyanocobalamin (Vitamin B-12) 5000 MCG sublingual tablet Place  under the tongue.     • dry mouth gel (BIOTENE ORALBALANCE) gel Apply  to the mouth or throat As Needed for Dry Mouth.     • DULoxetine (CYMBALTA) 60 MG capsule Take 60 mg by mouth Daily.     • escitalopram (LEXAPRO) 10 MG tablet Take 1 tablet by mouth once daily 90 tablet 0   • Folic Acid " (FOLATE PO) Take  by mouth.     • glimepiride (AMARYL) 4 MG tablet TAKE 1 TABLET EVERY MORNINGBEFORE BREAKFAST 30 tablet 0   • hydroxychloroquine (PLAQUENIL) 200 MG tablet Take 200 mg by mouth Daily.     • Insulin Disposable Pump (V-Go 40) kit Use daily 90 each 3   • isosorbide dinitrate (ISORDIL) 30 MG tablet      • losartan (COZAAR) 100 MG tablet Take 100 mg by mouth Daily.     • meclizine (ANTIVERT) 25 MG tablet Take 25 mg by mouth 3 (Three) Times a Day As Needed for Dizziness.     • metFORMIN ER (GLUCOPHAGE-XR) 500 MG 24 hr tablet Take 2 tablets by mouth Daily With Dinner. 180 tablet 3   • omeprazole (PrilOSEC) 20 MG capsule Take 1 capsule by mouth Every Morning Before Breakfast. 90 capsule 1   • ondansetron ODT (ZOFRAN-ODT) 4 MG disintegrating tablet 4 mg.     • oxymetazoline (AFRIN) 0.05 % nasal spray 2 sprays into the nostril(s) as directed by provider 2 (Two) Times a Day.     • rOPINIRole (Requip) 0.5 MG tablet Take 1 tablet by mouth Every Night. Take 1 hour before bedtime. 90 tablet 1   • simethicone (Gas-X) 80 MG chewable tablet Chew 1 tablet Every 6 (Six) Hours As Needed for Flatulence. 90 tablet 1   • triamterene-hydrochlorothiazide (MAXZIDE-25) 37.5-25 MG per tablet Take 1 tablet by mouth once daily 90 tablet 0   • [DISCONTINUED] insulin degludec (TRESIBA FLEXTOUCH) 100 UNIT/ML solution pen-injector injection Inject 20 Units under the skin into the appropriate area as directed Every Night. 18 mL 0   • [DISCONTINUED] insulin aspart (NovoLOG FlexPen) 100 UNIT/ML solution pen-injector sc pen Inject 10 Units under the skin into the appropriate area as directed 3 (Three) Times a Day With Meals for 90 days. 9 mL 2   • [DISCONTINUED] Sod Picosulfate-Mag Ox-Cit Acd 10-3.5-12 MG-GM -GM/160ML solution Take 1 kit by mouth Take As Directed. 320 mL 0     No current facility-administered medications on file prior to visit.       Results for orders placed or performed in visit on 06/20/22   POC Glycosylated Hemoglobin  (Hb A1C)    Specimen: Blood   Result Value Ref Range    Hemoglobin A1C 12.0 %    Lot Number [id number]     Expiration Date 03/01/2024        PE    Physical Exam  Vitals reviewed.   Constitutional:       General: She is not in acute distress.     Appearance: Normal appearance. She is well-developed. She is obese. She is not ill-appearing or diaphoretic.   HENT:      Head: Normocephalic and atraumatic.   Eyes:      Extraocular Movements: Extraocular movements intact.      Conjunctiva/sclera: Conjunctivae normal.   Pulmonary:      Effort: No respiratory distress.   Musculoskeletal:         General: Normal range of motion.      Cervical back: Normal range of motion.   Neurological:      General: No focal deficit present.      Mental Status: She is alert.   Psychiatric:         Attention and Perception: She is attentive.         Mood and Affect: Mood normal.         Speech: Speech normal.         Behavior: Behavior normal. Behavior is cooperative.         Thought Content: Thought content normal.         Judgment: Judgment normal.         A/P    Diagnoses and all orders for this visit:    1. Essential hypertension (Primary)  Stable, well-controlled.  Compliant on medication.    2. Uncontrolled type 2 diabetes mellitus with hyperglycemia ([facility])  -     POC Glycosylated Hemoglobin (Hb A1C)  -     insulin degludec (TRESIBA FLEXTOUCH) 100 UNIT/ML solution pen-injector injection; Inject 40 Units under the skin into the appropriate area as directed Every Night for 30 days.  Dispense: 12 mL; Refill: 0  -     Insulin Pen Needle (NovoFine Plus Pen Needle) 32G X 4 MM misc; Use 1 needle daily with insulin.  Dispense: 90 each; Refill: 3  Previous hemoglobin A1c was 12.3%, hemoglobin A1c today is 12%.  She reports that she is compliant on all medications.  She is currently on Tresiba 30 units daily but not taking every day.  She states that it is difficult to take nightly and will start taking it in the morning.  She is not titrating  her Tresiba up.  She states that she was prescribed some medications but they were denied by insurance and she will contact endocrinology for this.  Patient is having other symptoms that are most likely related to uncontrolled diabetes like brain fog, arthralgias and a few falls.  Discussed that it would be difficult to work these issues up until her glucose values are improved given that the glucose values are most likely contributing or causing the issues.  Encouraged her to watch her diet and limit any sugary foods or beverages.       Plan of care reviewed with patient at the conclusion of today's visit. Education was provided regarding diagnosis, management and any prescribed or recommended OTC medications.  Patient verbalizes understanding of and agreement with management plan.    Return in about 4 months (around 10/20/2022) for Medicare Wellness.     Tracee Gaona PA-C

## 2022-06-22 DIAGNOSIS — E11.43 TYPE 2 DIABETES MELLITUS WITH DIABETIC AUTONOMIC NEUROPATHY, WITH LONG-TERM CURRENT USE OF INSULIN: ICD-10-CM

## 2022-06-22 DIAGNOSIS — Z79.4 TYPE 2 DIABETES MELLITUS WITH DIABETIC AUTONOMIC NEUROPATHY, WITH LONG-TERM CURRENT USE OF INSULIN: ICD-10-CM

## 2022-06-22 DIAGNOSIS — E11.65 UNCONTROLLED TYPE 2 DIABETES MELLITUS WITH HYPERGLYCEMIA: ICD-10-CM

## 2022-06-22 RX ORDER — METFORMIN HYDROCHLORIDE 500 MG/1
1000 TABLET, EXTENDED RELEASE ORAL
Qty: 180 TABLET | Refills: 0 | Status: SHIPPED | OUTPATIENT
Start: 2022-06-22 | End: 2022-08-31

## 2022-06-22 RX ORDER — GLIMEPIRIDE 4 MG/1
4 TABLET ORAL
Qty: 90 TABLET | Refills: 0 | Status: SHIPPED | OUTPATIENT
Start: 2022-06-22 | End: 2022-07-29

## 2022-06-28 DIAGNOSIS — R10.13 EPIGASTRIC PAIN: ICD-10-CM

## 2022-06-28 DIAGNOSIS — I10 ESSENTIAL HYPERTENSION: ICD-10-CM

## 2022-06-28 RX ORDER — OMEPRAZOLE 20 MG/1
20 CAPSULE, DELAYED RELEASE ORAL
Qty: 90 CAPSULE | Refills: 1 | Status: SHIPPED | OUTPATIENT
Start: 2022-06-28

## 2022-06-28 RX ORDER — BISOPROLOL FUMARATE 10 MG/1
10 TABLET, FILM COATED ORAL DAILY
Qty: 90 TABLET | Refills: 1 | Status: SHIPPED | OUTPATIENT
Start: 2022-06-28 | End: 2022-09-13

## 2022-06-28 RX ORDER — TRIAMTERENE AND HYDROCHLOROTHIAZIDE 37.5; 25 MG/1; MG/1
1 TABLET ORAL DAILY
Qty: 90 TABLET | Refills: 1 | Status: SHIPPED | OUTPATIENT
Start: 2022-06-28 | End: 2022-09-13

## 2022-06-28 NOTE — TELEPHONE ENCOUNTER
Rx Refill Note  Requested Prescriptions     Pending Prescriptions Disp Refills   • bisoprolol (ZEBeta) 10 MG tablet 90 tablet 1     Sig: Take 1 tablet by mouth Daily.   • triamterene-hydrochlorothiazide (MAXZIDE-25) 37.5-25 MG per tablet 90 tablet 1     Sig: Take 1 tablet by mouth Daily.   • omeprazole (PrilOSEC) 20 MG capsule 90 capsule 1     Sig: Take 1 capsule by mouth Every Morning Before Breakfast.      Last office visit with prescribing clinician: 6/20/2022      Next office visit with prescribing clinician: 9/26/2022   }  Anabel Fournier MA  06/28/22, 11:39 EDT     Last Fill 5/24/22

## 2022-07-29 ENCOUNTER — DOCUMENTATION (OUTPATIENT)
Dept: DIABETES SERVICES | Facility: HOSPITAL | Age: 67
End: 2022-07-29

## 2022-07-29 ENCOUNTER — APPOINTMENT (OUTPATIENT)
Dept: DIABETES SERVICES | Facility: HOSPITAL | Age: 67
End: 2022-07-29

## 2022-07-29 ENCOUNTER — OFFICE VISIT (OUTPATIENT)
Dept: ENDOCRINOLOGY | Facility: CLINIC | Age: 67
End: 2022-07-29

## 2022-07-29 VITALS
HEART RATE: 94 BPM | HEIGHT: 60 IN | DIASTOLIC BLOOD PRESSURE: 82 MMHG | WEIGHT: 223 LBS | OXYGEN SATURATION: 97 % | SYSTOLIC BLOOD PRESSURE: 158 MMHG | BODY MASS INDEX: 43.78 KG/M2

## 2022-07-29 DIAGNOSIS — Z79.4 TYPE 2 DIABETES MELLITUS WITH DIABETIC AUTONOMIC NEUROPATHY, WITH LONG-TERM CURRENT USE OF INSULIN: Primary | ICD-10-CM

## 2022-07-29 DIAGNOSIS — E11.43 TYPE 2 DIABETES MELLITUS WITH DIABETIC AUTONOMIC NEUROPATHY, WITH LONG-TERM CURRENT USE OF INSULIN: Primary | ICD-10-CM

## 2022-07-29 LAB
EXPIRATION DATE: ABNORMAL
GLUCOSE BLDC GLUCOMTR-MCNC: 337 MG/DL (ref 70–130)
Lab: ABNORMAL

## 2022-07-29 PROCEDURE — 82947 ASSAY GLUCOSE BLOOD QUANT: CPT | Performed by: INTERNAL MEDICINE

## 2022-07-29 PROCEDURE — 99213 OFFICE O/P EST LOW 20 MIN: CPT | Performed by: INTERNAL MEDICINE

## 2022-07-29 PROCEDURE — G0108 DIAB MANAGE TRN  PER INDIV: HCPCS | Performed by: REGISTERED NURSE

## 2022-07-29 RX ORDER — SUB-Q INSULIN DEVICE, 40 UNIT
EACH MISCELLANEOUS
Qty: 30 EACH | Refills: 4 | Status: SHIPPED | OUTPATIENT
Start: 2022-07-29 | End: 2022-10-17

## 2022-07-29 NOTE — PROGRESS NOTES
"     Office Note      Date: 2022  Patient Name: Johnna Gonsalves  MRN: [id number]  : 1955    Chief Complaint   Patient presents with   • Diabetes       History of Present Illness:   Johnna Gonsalves is a 67 y.o. female who presents for Diabetes - type 2  On sfu, metformin and daily insulin   bg checks are done 3 times per day  She changed insurance since last time and wants to see about a vgo again- it seems to be covered  The amilcar and dexcom are not though     Last A1c:  Hemoglobin A1C   Date Value Ref Range Status   2022 12.0 % Final       Changes in health since last visit:  None . Last eye exam  Due and advised .    Subjective        Review of Systems:   Review of Systems   Eyes: Positive for visual disturbance.       The following portions of the patient's history were reviewed and updated as appropriate: allergies, current medications, past family history, past medical history, past social history, past surgical history and problem list.    Objective     Visit Vitals  /82   Pulse 94   Ht 152.4 cm (60\")   Wt 101 kg (223 lb)   SpO2 97%   BMI 43.55 kg/m²       Labs:    CMP  Lab Results   Component Value Date    GLUCOSE 192 (H) 2021    BUN 13 2021    CREATININE 0.80 10/27/2021    EGFRIFNONA 78 2021    EGFRIFAFRI 94 2021    BCR 17.3 2021    K 3.6 2021    CO2 26.6 2021    CALCIUM 9.1 2021    PROTENTOTREF 7.2 2021    LABIL2 1.5 2021    AST 21 2021    ALT 19 2021        CBC w/DIFF  Lab Results   Component Value Date    WBC 8.39 2021    RBC 4.90 2021    HGB 15.3 2021    HCT 44.8 2021    MCV 91.4 2021    MCH 31.2 2021    MCHC 34.2 2021    RDW 12.9 2021     2021    NEUTRORELPCT 64.0 2021    LYMPHORELPCT 28.1 2021    MONORELPCT 6.0 2021    EOSRELPCT 1.2 2021    BASORELPCT 0.6 2021    NEUTROABS 5.37 2021    LYMPHSABS 2.36 " 07/28/2021    MONOSABS 0.50 07/28/2021    EOSABS 0.10 07/28/2021    BASOSABS 0.05 07/28/2021    NRBC 0.0 07/28/2021       Physical Exam:  Physical Exam  Vitals reviewed.   Constitutional:       Appearance: Normal appearance.   HENT:      Head: Normocephalic and atraumatic.   Neurological:      Mental Status: She is alert.   Psychiatric:         Mood and Affect: Mood normal.         Behavior: Behavior normal.         Thought Content: Thought content normal.         Judgment: Judgment normal.          Assessment / Plan      Assessment & Plan:  Problem List Items Addressed This Visit        Other    Type 2 diabetes mellitus with diabetic autonomic neuropathy, with long-term current use of insulin (HCC) - Primary    Current Assessment & Plan      I will try again to get her on a vgo device.   Unchanged,remains very high and unstable.         Relevant Medications    metFORMIN ER (GLUCOPHAGE-XR) 500 MG 24 hr tablet    insulin lispro (HumaLOG) 100 UNIT/ML injection    Other Relevant Orders    POC Glucose, Blood (Completed)           Akin Hill MD   07/29/2022

## 2022-08-02 ENCOUNTER — TELEPHONE (OUTPATIENT)
Dept: ENDOCRINOLOGY | Facility: CLINIC | Age: 67
End: 2022-08-02

## 2022-08-02 NOTE — TELEPHONE ENCOUNTER
Spoke with patient.  She requested prescription for Brianna.  Ok to send?  Also, requested samples of VGO to  as they did not send from mail order pending corrected insulin dose.  Patient is aware the insulin prescription has been updated.

## 2022-08-02 NOTE — TELEPHONE ENCOUNTER
PATIENT IS REQUESTING A RETURN CALL TO DISCUSS AN ORDER PLACED DURING HER RECENT VISIT.    CALL BACK [phone]

## 2022-08-08 RX ORDER — INSULIN ASPART 100 [IU]/ML
INJECTION, SOLUTION INTRAVENOUS; SUBCUTANEOUS
Qty: 70 ML | Refills: 1 | Status: SHIPPED | OUTPATIENT
Start: 2022-08-08 | End: 2022-12-29

## 2022-08-08 NOTE — TELEPHONE ENCOUNTER
Patient called requesting a return phone call [phone] to discuss that Lispro is not covered with her insurance plan, she stated that she was told that Novolin was covered under her insurance plan.

## 2022-08-11 RX ORDER — GLUCOSAM/CHON-MSM1/C/MANG/BOSW 500-416.6
TABLET ORAL
Qty: 100 EACH | Refills: 11 | Status: SHIPPED | OUTPATIENT
Start: 2022-08-11

## 2022-08-11 RX ORDER — BLOOD-GLUCOSE CONTROL, LOW
1 EACH MISCELLANEOUS AS NEEDED
Qty: 1 EACH | Refills: 5 | Status: SHIPPED | OUTPATIENT
Start: 2022-08-11 | End: 2022-08-17 | Stop reason: SDUPTHER

## 2022-08-11 RX ORDER — ISOPROPYL ALCOHOL 0.75 G/1
1 SWAB TOPICAL DAILY
Qty: 100 EACH | Refills: 12 | Status: SHIPPED | OUTPATIENT
Start: 2022-08-11 | End: 2022-08-17 | Stop reason: SDUPTHER

## 2022-08-12 NOTE — TELEPHONE ENCOUNTER
Spoke with patient.  She states [facility] has rx for Novolog.  She was not aware that was the insulin that went in the Vgo.  Patient is going to have them send rx.

## 2022-08-12 NOTE — TELEPHONE ENCOUNTER
Patient called stating that she has no Insulin for VGO. Patient has been giving themself a shot in the morning. Do we have any samples we could send via mail? Patient is also not sure what insulin they use. Patient requested a call back

## 2022-08-17 RX ORDER — ISOPROPYL ALCOHOL 0.75 G/1
1 SWAB TOPICAL DAILY
Qty: 100 EACH | Refills: 3 | Status: SHIPPED | OUTPATIENT
Start: 2022-08-17

## 2022-08-17 RX ORDER — BLOOD-GLUCOSE CONTROL, LOW
1 EACH MISCELLANEOUS AS NEEDED
Qty: 1 EACH | Refills: 5 | Status: SHIPPED | OUTPATIENT
Start: 2022-08-17

## 2022-08-31 DIAGNOSIS — E11.43 TYPE 2 DIABETES MELLITUS WITH DIABETIC AUTONOMIC NEUROPATHY, WITH LONG-TERM CURRENT USE OF INSULIN: ICD-10-CM

## 2022-08-31 DIAGNOSIS — Z79.4 TYPE 2 DIABETES MELLITUS WITH DIABETIC AUTONOMIC NEUROPATHY, WITH LONG-TERM CURRENT USE OF INSULIN: ICD-10-CM

## 2022-08-31 RX ORDER — METFORMIN HYDROCHLORIDE 500 MG/1
1000 TABLET, EXTENDED RELEASE ORAL
Qty: 180 TABLET | Refills: 1 | Status: SHIPPED | OUTPATIENT
Start: 2022-08-31 | End: 2022-12-12

## 2022-09-13 DIAGNOSIS — R45.4 IRRITABILITY: ICD-10-CM

## 2022-09-13 DIAGNOSIS — I10 ESSENTIAL HYPERTENSION: ICD-10-CM

## 2022-09-13 RX ORDER — ESCITALOPRAM OXALATE 10 MG/1
TABLET ORAL
Qty: 90 TABLET | Refills: 0 | Status: SHIPPED | OUTPATIENT
Start: 2022-09-13 | End: 2023-03-15

## 2022-09-13 RX ORDER — BISOPROLOL FUMARATE 10 MG/1
TABLET, FILM COATED ORAL
Qty: 90 TABLET | Refills: 0 | Status: SHIPPED | OUTPATIENT
Start: 2022-09-13 | End: 2023-02-02 | Stop reason: SDUPTHER

## 2022-09-13 RX ORDER — TRIAMTERENE AND HYDROCHLOROTHIAZIDE 37.5; 25 MG/1; MG/1
TABLET ORAL
Qty: 90 TABLET | Refills: 0 | Status: SHIPPED | OUTPATIENT
Start: 2022-09-13

## 2022-09-13 NOTE — TELEPHONE ENCOUNTER
Rx Refill Note  Requested Prescriptions     Pending Prescriptions Disp Refills   • triamterene-hydrochlorothiazide (MAXZIDE-25) 37.5-25 MG per tablet [Pharmacy Med Name: Triamterene-HCTZ 37.5-25 MG Oral Tablet] 90 tablet 0     Sig: Take 1 tablet by mouth once daily   • bisoprolol (ZEBeta) 10 MG tablet [Pharmacy Med Name: Bisoprolol Fumarate 10 MG Oral Tablet] 90 tablet 0     Sig: Take 1 tablet by mouth once daily      Last office visit with prescribing clinician: 6/20/2022      Next office visit with prescribing clinician: 12/2/2022            Jn Bonilla MA  09/13/22, 11:52 EDT     Called and spoke with patient she did request refills she doesn't use [facility] any more her mail order is now [facility])

## 2022-09-22 ENCOUNTER — TELEPHONE (OUTPATIENT)
Dept: SLEEP MEDICINE | Facility: HOSPITAL | Age: 67
End: 2022-09-22

## 2022-09-22 NOTE — TELEPHONE ENCOUNTER
Caller: Johnna Gonsalves    Relationship to patient: Self    Best call back number: [phone]    Type of visit: MYCHART    Requested date: NEXT AVAILABLE    If rescheduling, when is the original appointment 09/22/22    Additional notes: PT HAD A MYCHART VISIT AND COULD NOT FIGURE HOW TO GET LOGGED ON PT WOULD LIKE TO RESCHEDULE AND IF SOMEONE COULD EXPLAIN HOW TO LOG INTO VIST

## 2022-09-28 ENCOUNTER — OFFICE VISIT (OUTPATIENT)
Dept: SLEEP MEDICINE | Facility: HOSPITAL | Age: 67
End: 2022-09-28

## 2022-09-28 VITALS
OXYGEN SATURATION: 97 % | BODY MASS INDEX: 44.37 KG/M2 | HEART RATE: 72 BPM | HEIGHT: 60 IN | DIASTOLIC BLOOD PRESSURE: 71 MMHG | WEIGHT: 226 LBS | SYSTOLIC BLOOD PRESSURE: 156 MMHG

## 2022-09-28 DIAGNOSIS — G47.10 HYPERSOMNIA WITH SLEEP APNEA: ICD-10-CM

## 2022-09-28 DIAGNOSIS — G47.33 OSA (OBSTRUCTIVE SLEEP APNEA): Primary | ICD-10-CM

## 2022-09-28 DIAGNOSIS — G47.30 HYPERSOMNIA WITH SLEEP APNEA: ICD-10-CM

## 2022-09-28 PROCEDURE — 99213 OFFICE O/P EST LOW 20 MIN: CPT | Performed by: NURSE PRACTITIONER

## 2022-09-28 RX ORDER — GLIMEPIRIDE 4 MG/1
TABLET ORAL
COMMUNITY
Start: 2022-08-27 | End: 2022-09-28

## 2022-09-28 NOTE — PROGRESS NOTES
Chief Complaint:   Chief Complaint   Patient presents with   • Follow-up       HPI:    Johnna Gonsalves is a 67 y.o. female here for follow-up of sleep apnea.  Patient was last seen 4/21/2022.  Patient's machine that stopped working and did receive a new one in July.  Patient states she does have difficulty with mask leak at times.  Her usage showed 37 out of the last 90 days.  Greater than 4-hour use is 30%.  Patient is going to work with her DME for possible new mask or mask fit.  Her AHI is 18/24 and still excessively sleepy.  We did discuss once patient is compliant we can offer stimulant therapy.  She sleeps 6 hours nightly and goes to sleep quickly.  She will get up 1-2 times in the night.  Patient has no concerns and does wish to be compliant.        Current medications are:   Current Outpatient Medications:   •  Alcohol Swabs (B-D SINGLE USE SWABS REGULAR) pads, 1 pad Daily., Disp: 100 each, Rfl: 3  •  aspirin 81 MG chewable tablet, Chew 81 mg Daily., Disp: , Rfl:   •  atorvastatin (LIPITOR) 40 MG tablet, Take 40 mg by mouth Daily., Disp: , Rfl:   •  bisoprolol (ZEBeta) 10 MG tablet, Take 1 tablet by mouth once daily, Disp: 90 tablet, Rfl: 0  •  Blood Glucose Calibration (True Metrix Level 1) Low solution, 1 each by Other route As Needed (to test blood glucose device)., Disp: 1 each, Rfl: 5  •  Blood Glucose Monitoring Suppl (True Focus Blood Glucose Meter) device, 1 Device Continuous., Disp: 1 each, Rfl: 0  •  Cholecalciferol (vitamin D3) 125 MCG (5000 UT) capsule capsule, Take 5,000 Units by mouth Daily., Disp: , Rfl:   •  Continuous Blood Gluc  (FreeStyle Brianna 2 [place]) device, 1 Device See Admin Instructions., Disp: 1 each, Rfl: 0  •  Continuous Blood Gluc Sensor (FreeStyle Brianna 2 Sensor) misc, 1 each Every 14 (Fourteen) Days., Disp: 2 each, Rfl: 11  •  Cyanocobalamin (Vitamin B-12) 5000 MCG sublingual tablet, Place  under the tongue., Disp: , Rfl:   •  dry mouth gel (BIOTENE ORALBALANCE)  gel, Apply  to the mouth or throat As Needed for Dry Mouth., Disp: , Rfl:   •  DULoxetine (CYMBALTA) 60 MG capsule, Take 60 mg by mouth Daily., Disp: , Rfl:   •  escitalopram (LEXAPRO) 10 MG tablet, Take 1 tablet by mouth once daily, Disp: 90 tablet, Rfl: 0  •  Folic Acid (FOLATE PO), Take  by mouth., Disp: , Rfl:   •  glucose blood test strip, PRN, Disp: 60 each, Rfl: 5  •  hydroxychloroquine (PLAQUENIL) 200 MG tablet, Take 200 mg by mouth Daily., Disp: , Rfl:   •  Insulin Aspart (NovoLOG) 100 UNIT/ML injection, Use as directed in vgo device MDD 76 units, Disp: 70 mL, Rfl: 1  •  Insulin Disposable Pump (V-Go 40) kit, Use daily to give insulin, Disp: 30 each, Rfl: 4  •  insulin lispro (HumaLOG) 100 UNIT/ML injection, Use as directed in  vgo device MDD 76 units, Disp: 70 mL, Rfl: 1  •  isosorbide dinitrate (ISORDIL) 30 MG tablet, Take 30 mg by mouth., Disp: , Rfl:   •  losartan (COZAAR) 100 MG tablet, Take 100 mg by mouth Daily., Disp: , Rfl:   •  meclizine (ANTIVERT) 25 MG tablet, Take 25 mg by mouth 3 (Three) Times a Day As Needed for Dizziness., Disp: , Rfl:   •  metFORMIN ER (GLUCOPHAGE-XR) 500 MG 24 hr tablet, TAKE 2 TABLETS BY MOUTH DAILY WITH DINNER., Disp: 180 tablet, Rfl: 1  •  omeprazole (PrilOSEC) 20 MG capsule, Take 1 capsule by mouth Every Morning Before Breakfast., Disp: 90 capsule, Rfl: 1  •  ondansetron ODT (ZOFRAN-ODT) 4 MG disintegrating tablet, 4 mg., Disp: , Rfl:   •  oxymetazoline (AFRIN) 0.05 % nasal spray, 2 sprays into the nostril(s) as directed by provider 2 (Two) Times a Day., Disp: , Rfl:   •  rOPINIRole (Requip) 0.5 MG tablet, Take 1 tablet by mouth Every Night. Take 1 hour before bedtime., Disp: 90 tablet, Rfl: 1  •  simethicone (Gas-X) 80 MG chewable tablet, Chew 1 tablet Every 6 (Six) Hours As Needed for Flatulence., Disp: 90 tablet, Rfl: 1  •  triamterene-hydrochlorothiazide (MAXZIDE-25) 37.5-25 MG per tablet, Take 1 tablet by mouth once daily, Disp: 90 tablet, Rfl: 0  •  TRUEplus  "Lancets 33G misc, Use to check blood surgars daily, Disp: 100 each, Rfl: 11.      The patient's relevant past medical, surgical, family and social history were reviewed and updated in Epic as appropriate.       Review of Systems   HENT: Positive for congestion, rhinorrhea, sinus pressure, sinus pain and trouble swallowing.    Eyes: Positive for visual disturbance.   Respiratory: Positive for apnea and chest tightness.    Cardiovascular: Positive for palpitations and leg swelling.   Genitourinary: Positive for frequency.   Musculoskeletal: Positive for arthralgias, back pain, myalgias, neck pain and neck stiffness.   Allergic/Immunologic: Positive for environmental allergies.   Neurological: Positive for dizziness, tremors, weakness, light-headedness, numbness and headaches.   Psychiatric/Behavioral: Positive for dysphoric mood and sleep disturbance.   All other systems reviewed and are negative.        Objective:    Physical Exam  Constitutional:       Appearance: Normal appearance.   HENT:      Head: Normocephalic and atraumatic.      Mouth/Throat:      Comments: Mallampati 3 anatomy  Cardiovascular:      Rate and Rhythm: Normal rate and regular rhythm.   Pulmonary:      Effort: Pulmonary effort is normal.      Breath sounds: Normal breath sounds.   Skin:     General: Skin is warm and dry.   Neurological:      Mental Status: She is alert and oriented to person, place, and time.   Psychiatric:         Mood and Affect: Mood normal.         Behavior: Behavior normal.         Thought Content: Thought content normal.         Judgment: Judgment normal.       /71   Pulse 72   Ht 152.4 cm (60\")   Wt 103 kg (226 lb)   SpO2 97%   BMI 44.14 kg/m²   CPAP Report  37/90 days of use  Greater than 4-hour use 30%  Settings 10-20  AHI of 3.5  95th percentile pressure 14.7    The patient continues to use and benefit from CPAP therapy.    ASSESSMENT/PLAN    Diagnoses and all orders for this visit:    1. AMELIA (obstructive " sleep apnea) (Primary)  -     PAP Therapy    2. Hypersomnia with sleep apnea        1. Counseled patient regarding multimodal approach with healthy nutrition, healthy sleep, regular physical activity, social activities, counseling, and medications. Encouraged to practice lateral sleep position. Avoid alcohol and sedatives close to bedtime.  Refill supplies x1 year.  I will see patient back in 3 months if she is compliant we will consider stimulant therapy.  Patient is going to meet with DME for possible new mask or new mask fit.      I have reviewed the results of my evaluation and impression and discussed my recommendations in detail with the patient.      Signed by  Larisa Craig, RANDI    September 28, 2022      CC: Tracee Gaona PA-C         No ref. provider found

## 2022-10-17 RX ORDER — SUB-Q INSULIN DEVICE, 40 UNIT
EACH MISCELLANEOUS
Qty: 90 EACH | Refills: 0 | Status: SHIPPED | OUTPATIENT
Start: 2022-10-17 | End: 2022-12-12

## 2022-11-01 ENCOUNTER — IMMUNIZATION (OUTPATIENT)
Dept: FAMILY MEDICINE CLINIC | Facility: CLINIC | Age: 67
End: 2022-11-01

## 2022-11-01 DIAGNOSIS — Z23 NEED FOR COVID-19 VACCINE: Primary | ICD-10-CM

## 2022-11-01 DIAGNOSIS — Z23 IMMUNIZATION DUE: Primary | ICD-10-CM

## 2022-11-01 PROCEDURE — 91312 COVID-19 (PFIZER) BIVALENT BOOSTER 12+YRS: CPT | Performed by: PHYSICIAN ASSISTANT

## 2022-11-01 PROCEDURE — 0124A COVID-19 (PFIZER) BIVALENT BOOSTER 12+YRS: CPT | Performed by: PHYSICIAN ASSISTANT

## 2022-12-12 ENCOUNTER — OFFICE VISIT (OUTPATIENT)
Dept: ENDOCRINOLOGY | Facility: CLINIC | Age: 67
End: 2022-12-12

## 2022-12-12 VITALS
SYSTOLIC BLOOD PRESSURE: 130 MMHG | BODY MASS INDEX: 43.39 KG/M2 | OXYGEN SATURATION: 97 % | HEART RATE: 72 BPM | WEIGHT: 221 LBS | DIASTOLIC BLOOD PRESSURE: 64 MMHG | HEIGHT: 60 IN

## 2022-12-12 DIAGNOSIS — E11.65 UNCONTROLLED TYPE 2 DIABETES MELLITUS WITH HYPERGLYCEMIA: Primary | ICD-10-CM

## 2022-12-12 LAB
EXPIRATION DATE: ABNORMAL
EXPIRATION DATE: NORMAL
GLUCOSE BLDC GLUCOMTR-MCNC: 450 MG/DL (ref 70–130)
HBA1C MFR BLD: 11.4 %
Lab: ABNORMAL
Lab: NORMAL

## 2022-12-12 PROCEDURE — 82947 ASSAY GLUCOSE BLOOD QUANT: CPT | Performed by: INTERNAL MEDICINE

## 2022-12-12 PROCEDURE — 83036 HEMOGLOBIN GLYCOSYLATED A1C: CPT | Performed by: INTERNAL MEDICINE

## 2022-12-12 PROCEDURE — 3046F HEMOGLOBIN A1C LEVEL >9.0%: CPT | Performed by: INTERNAL MEDICINE

## 2022-12-12 PROCEDURE — 99213 OFFICE O/P EST LOW 20 MIN: CPT | Performed by: INTERNAL MEDICINE

## 2022-12-12 RX ORDER — SUB-Q INSULIN DEVICE, 40 UNIT
EACH MISCELLANEOUS
Qty: 30 EACH | Refills: 5 | Status: SHIPPED | OUTPATIENT
Start: 2022-12-12 | End: 2023-02-06 | Stop reason: SDUPTHER

## 2022-12-12 RX ORDER — INSULIN HUMAN 500 [IU]/ML
INJECTION, SOLUTION SUBCUTANEOUS
Qty: 20 ML | Refills: 5 | Status: SHIPPED | OUTPATIENT
Start: 2022-12-12 | End: 2023-02-06 | Stop reason: SDUPTHER

## 2022-12-12 RX ORDER — IBUPROFEN 200 MG
TABLET ORAL
Qty: 100 EACH | Refills: 3 | Status: SHIPPED | OUTPATIENT
Start: 2022-12-12 | End: 2023-03-15

## 2022-12-12 NOTE — PROGRESS NOTES
"     Office Note      Date: 2022  Patient Name: Johnna Gonsalves  MRN: [id number]  : 1955    Chief Complaint   Patient presents with   • Diabetes     Type II       History of Present Illness:   Johnna Gonsalves is a 67 y.o. female who presents for Diabetes type 2.   Current RX- insulin in a vgo device/ and metformin.  But metformin is making her sick     Bg checks are done couple times per day   Hypoglycemia :none      Last A1c:  Hemoglobin A1C   Date Value Ref Range Status   2022 11.4 % Final       Changes in health since last visit: none . Last eye exam due and advised .  She is drinking at least one regular pop per day     Subjective           Review of Systems:   Review of Systems   Constitutional: Negative.    HENT: Negative.    Endocrine: Positive for polydipsia and polyuria.       The following portions of the patient's history were reviewed and updated as appropriate: allergies, current medications, past family history, past medical history, past social history, past surgical history and problem list.    Objective     Visit Vitals  /64 (BP Location: Left arm, Patient Position: Sitting, Cuff Size: Adult)   Pulse 72   Ht 152.4 cm (60\")   Wt 100 kg (221 lb)   SpO2 97%   BMI 43.16 kg/m²       Labs:    CMP  Lab Results   Component Value Date    GLUCOSE 192 (H) 2021    BUN 13 2021    CREATININE 0.80 10/27/2021    EGFRIFNONA 78 2021    EGFRIFAFRI 94 2021    BCR 17.3 2021    K 3.6 2021    CO2 26.6 2021    CALCIUM 9.1 2021    PROTENTOTREF 7.2 2021    LABIL2 1.5 2021    AST 21 2021    ALT 19 2021        CBC w/DIFF  Lab Results   Component Value Date    WBC 8.39 2021    RBC 4.90 2021    HGB 15.3 2021    HCT 44.8 2021    MCV 91.4 2021    MCH 31.2 2021    MCHC 34.2 2021    RDW 12.9 2021     2021    NEUTRORELPCT 64.0 2021    LYMPHORELPCT 28.1 " 07/28/2021    MONORELPCT 6.0 07/28/2021    EOSRELPCT 1.2 07/28/2021    BASORELPCT 0.6 07/28/2021    NEUTROABS 5.37 07/28/2021    LYMPHSABS 2.36 07/28/2021    MONOSABS 0.50 07/28/2021    EOSABS 0.10 07/28/2021    BASOSABS 0.05 07/28/2021    NRBC 0.0 07/28/2021       Physical Exam:  Physical Exam  Vitals reviewed.   Constitutional:       Appearance: Normal appearance.   Neurological:      Mental Status: She is alert.   Psychiatric:         Mood and Affect: Mood normal.         Behavior: Behavior normal.         Thought Content: Thought content normal.         Judgment: Judgment normal.          Assessment / Plan      Assessment & Plan:  Problem List Items Addressed This Visit        Other    Uncontrolled type 2 diabetes mellitus with hyperglycemia (HCC) - Primary    Current Assessment & Plan     a1c 11.4 is marginally better and still well above goal.  The plan is change to u500 insulin in a vgo 20.  For that she will just do 3 clicks at meals          Relevant Medications    Insulin Aspart (NovoLOG) 100 UNIT/ML injection    insulin regular (HUMULIN R) 500 UNIT/ML CONCENTRATED injection        Akin Hill MD   12/12/2022

## 2022-12-29 RX ORDER — INSULIN ASPART 100 [IU]/ML
INJECTION, SOLUTION INTRAVENOUS; SUBCUTANEOUS
Qty: 70 ML | Refills: 1 | Status: SHIPPED | OUTPATIENT
Start: 2022-12-29 | End: 2023-02-21 | Stop reason: SDUPTHER

## 2023-02-02 ENCOUNTER — OFFICE VISIT (OUTPATIENT)
Dept: FAMILY MEDICINE CLINIC | Facility: CLINIC | Age: 68
End: 2023-02-02
Payer: MEDICARE

## 2023-02-02 VITALS
HEART RATE: 76 BPM | TEMPERATURE: 98.6 F | WEIGHT: 221.5 LBS | DIASTOLIC BLOOD PRESSURE: 80 MMHG | HEIGHT: 60 IN | SYSTOLIC BLOOD PRESSURE: 134 MMHG | OXYGEN SATURATION: 98 % | BODY MASS INDEX: 43.49 KG/M2

## 2023-02-02 DIAGNOSIS — E66.01 OBESITY, MORBID, BMI 40.0-49.9: ICD-10-CM

## 2023-02-02 DIAGNOSIS — I10 ESSENTIAL HYPERTENSION: ICD-10-CM

## 2023-02-02 DIAGNOSIS — H61.22 IMPACTED CERUMEN OF LEFT EAR: ICD-10-CM

## 2023-02-02 DIAGNOSIS — Z00.00 MEDICARE ANNUAL WELLNESS VISIT, SUBSEQUENT: Primary | ICD-10-CM

## 2023-02-02 DIAGNOSIS — Z79.4 TYPE 2 DIABETES MELLITUS WITH DIABETIC AUTONOMIC NEUROPATHY, WITH LONG-TERM CURRENT USE OF INSULIN: ICD-10-CM

## 2023-02-02 DIAGNOSIS — Z12.31 ENCOUNTER FOR SCREENING MAMMOGRAM FOR MALIGNANT NEOPLASM OF BREAST: ICD-10-CM

## 2023-02-02 DIAGNOSIS — M25.561 ACUTE PAIN OF RIGHT KNEE: ICD-10-CM

## 2023-02-02 DIAGNOSIS — G47.33 SEVERE OBSTRUCTIVE SLEEP APNEA: ICD-10-CM

## 2023-02-02 DIAGNOSIS — I25.2 HISTORY OF MI (MYOCARDIAL INFARCTION): ICD-10-CM

## 2023-02-02 DIAGNOSIS — E11.43 TYPE 2 DIABETES MELLITUS WITH DIABETIC AUTONOMIC NEUROPATHY, WITH LONG-TERM CURRENT USE OF INSULIN: ICD-10-CM

## 2023-02-02 DIAGNOSIS — E78.2 MIXED HYPERLIPIDEMIA: ICD-10-CM

## 2023-02-02 PROBLEM — I21.4 NON-STEMI (NON-ST ELEVATED MYOCARDIAL INFARCTION): Status: ACTIVE | Noted: 2023-02-02

## 2023-02-02 PROBLEM — B39.9 HISTOPLASMOSIS RETINITIS: Status: ACTIVE | Noted: 2023-02-02

## 2023-02-02 PROBLEM — H32 HISTOPLASMOSIS RETINITIS: Status: ACTIVE | Noted: 2023-02-02

## 2023-02-02 PROCEDURE — 1125F AMNT PAIN NOTED PAIN PRSNT: CPT | Performed by: PHYSICIAN ASSISTANT

## 2023-02-02 PROCEDURE — 1170F FXNL STATUS ASSESSED: CPT | Performed by: PHYSICIAN ASSISTANT

## 2023-02-02 PROCEDURE — 1159F MED LIST DOCD IN RCRD: CPT | Performed by: PHYSICIAN ASSISTANT

## 2023-02-02 PROCEDURE — 69209 REMOVE IMPACTED EAR WAX UNI: CPT | Performed by: PHYSICIAN ASSISTANT

## 2023-02-02 PROCEDURE — G0439 PPPS, SUBSEQ VISIT: HCPCS | Performed by: PHYSICIAN ASSISTANT

## 2023-02-02 PROCEDURE — 99213 OFFICE O/P EST LOW 20 MIN: CPT | Performed by: PHYSICIAN ASSISTANT

## 2023-02-02 RX ORDER — ISOSORBIDE MONONITRATE 30 MG/1
TABLET, EXTENDED RELEASE ORAL
COMMUNITY
Start: 2022-12-08 | End: 2023-02-02

## 2023-02-02 RX ORDER — METFORMIN HYDROCHLORIDE 500 MG/1
TABLET, EXTENDED RELEASE ORAL
COMMUNITY
End: 2023-02-02

## 2023-02-02 RX ORDER — MELOXICAM 15 MG/1
15 TABLET ORAL DAILY
Qty: 30 TABLET | Refills: 0 | Status: SHIPPED | OUTPATIENT
Start: 2023-02-02 | End: 2023-03-15

## 2023-02-02 RX ORDER — INSULIN DEGLUDEC 100 U/ML
INJECTION, SOLUTION SUBCUTANEOUS
COMMUNITY
End: 2023-02-02

## 2023-02-02 RX ORDER — GLYBURIDE 3 MG/1
TABLET ORAL
COMMUNITY
End: 2023-02-02

## 2023-02-02 RX ORDER — BISOPROLOL FUMARATE 10 MG/1
10 TABLET, FILM COATED ORAL DAILY
Qty: 90 TABLET | Refills: 3 | Status: SHIPPED | OUTPATIENT
Start: 2023-02-02

## 2023-02-02 RX ORDER — PSEUDOEPHEDRINE HCL 30 MG
100 TABLET ORAL
COMMUNITY
End: 2023-02-02

## 2023-02-02 NOTE — PROGRESS NOTES
The ABCs of the Annual Wellness Visit  Subsequent Medicare Wellness Visit    Subjective    Johnna Gonsalves is a 68 y.o. female who presents for a Subsequent Medicare Wellness Visit.    The following portions of the patient's history were reviewed and   updated as appropriate: allergies, current medications, past family history, past medical history, past social history, past surgical history and problem list.    Compared to one year ago, the patient feels her physical   health is worse.    Compared to one year ago, the patient feels her mental   health is the same.    Recent Hospitalizations:  She was not admitted to the hospital during the last year.       Current Medical Providers:  Patient Care Team:  Tracee Gaona PA-C as PCP - General (Physician Assistant)  Danya Kyle MD as Consulting Physician (Cardiology)  Og Mclean MD as Consulting Physician (Rheumatology)  Akin Hill MD as Consulting Physician (Endocrinology)    Outpatient Medications Prior to Visit   Medication Sig Dispense Refill   • Alcohol Swabs (B-D SINGLE USE SWABS REGULAR) pads 1 pad Daily. 100 each 3   • aspirin 81 MG chewable tablet Chew 81 mg Daily.     • atorvastatin (LIPITOR) 40 MG tablet Take 40 mg by mouth Daily.     • Blood Glucose Calibration (True Metrix Level 1) Low solution 1 each by Other route As Needed (to test blood glucose device). 1 each 5   • Blood Glucose Monitoring Suppl (True Focus Blood Glucose Meter) device 1 Device Continuous. 1 each 0   • DULoxetine (CYMBALTA) 60 MG capsule Take 60 mg by mouth Daily.     • escitalopram (LEXAPRO) 10 MG tablet Take 1 tablet by mouth once daily 90 tablet 0   • glucose blood test strip PRN 60 each 5   • hydroxychloroquine (PLAQUENIL) 200 MG tablet Take 200 mg by mouth Daily.     • Insulin Disposable Pump (V-Go 20) kit Use daily to give insulin 30 each 5   • insulin regular (HUMULIN R) 500 UNIT/ML CONCENTRATED injection Use 150 units per day in vgo 20 mL  "5   • Insulin Syringe 29G X 1/2\" 1 ML misc Use daily to fill vgo 100 each 3   • losartan (COZAAR) 100 MG tablet Take 100 mg by mouth Daily.     • meclizine (ANTIVERT) 25 MG tablet Take 25 mg by mouth 3 (Three) Times a Day As Needed for Dizziness.     • NovoLOG 100 UNIT/ML injection USE AS DIRECTED IN VGO DEVICE MAX DAILY DOSE 76 UNITS (DISCARD VIAL 28 DAYS AFTER OPENING) 70 mL 1   • omeprazole (PrilOSEC) 20 MG capsule Take 1 capsule by mouth Every Morning Before Breakfast. 90 capsule 1   • ondansetron ODT (ZOFRAN-ODT) 4 MG disintegrating tablet 4 mg.     • rOPINIRole (Requip) 0.5 MG tablet Take 1 tablet by mouth Every Night. Take 1 hour before bedtime. 90 tablet 1   • triamterene-hydrochlorothiazide (MAXZIDE-25) 37.5-25 MG per tablet Take 1 tablet by mouth once daily 90 tablet 0   • TRUEplus Lancets 33G misc Use to check blood surgars daily 100 each 11   • bisoprolol (ZEBeta) 10 MG tablet Take 1 tablet by mouth once daily 90 tablet 0   • Cholecalciferol (vitamin D3) 125 MCG (5000 UT) capsule capsule Take 5,000 Units by mouth Daily.     • Cyanocobalamin (Vitamin B-12) 5000 MCG sublingual tablet Place  under the tongue.     • docusate sodium 100 MG capsule 100 mg.     • dry mouth gel (BIOTENE ORALBALANCE) gel Apply  to the mouth or throat As Needed for Dry Mouth.     • Folic Acid (FOLATE PO) Take  by mouth.     • glyburide micronized (GLYNASE) 3 MG tablet Once Daily for Diabetes     • Insulin Degludec (TRESIBA) 100 UNIT/ML solution injection Once Daily     • isosorbide dinitrate (ISORDIL) 30 MG tablet Take 30 mg by mouth.     • isosorbide mononitrate (IMDUR) 30 MG 24 hr tablet      • metFORMIN ER (GLUCOPHAGE-XR) 500 MG 24 hr tablet Twice A Day     • oxymetazoline (AFRIN) 0.05 % nasal spray 2 sprays into the nostril(s) as directed by provider 2 (Two) Times a Day.     • simethicone (Gas-X) 80 MG chewable tablet Chew 1 tablet Every 6 (Six) Hours As Needed for Flatulence. 90 tablet 1     No facility-administered " "medications prior to visit.       No opioid medication identified on active medication list. I have reviewed chart for other potential  high risk medication/s and harmful drug interactions in the elderly.          Aspirin is on active medication list. Aspirin use is indicated based on review of current medical condition/s. Pros and cons of this therapy have been discussed today. Benefits of this medication outweigh potential harm.  Patient has been encouraged to continue taking this medication.  .      Patient Active Problem List   Diagnosis   • Periodic headache syndrome, not intractable   • Essential tremor   • Fibromyalgia   • Chronic pain syndrome   • Type 2 diabetes mellitus with diabetic autonomic neuropathy, with long-term current use of insulin ([facility])   • RLS (restless legs syndrome)   • Essential hypertension   • History of MI (myocardial infarction)   • Mixed hyperlipidemia   • Obesity, morbid, BMI 40.0-49.9 ([facility])   • Uncontrolled type 2 diabetes mellitus with hyperglycemia ([facility])   • Sjogren's syndrome with keratoconjunctivitis sicca ([facility])   • Osteoarthritis of multiple joints   • Noncompliance with medications   • Severe obstructive sleep apnea   • Excessive daytime sleepiness   • Histoplasmosis retinitis   • Non-STEMI (non-ST elevated myocardial infarction) ([facility])     Advance Care Planning  Advance Directive is not on file.  ACP discussion was held with the patient during this visit. Patient does not have an advance directive, declines further assistance.     Objective    Vitals:    02/02/23 1028   BP: 134/80   BP Location: Right arm   Patient Position: Sitting   Cuff Size: Adult   Pulse: 76   Temp: 98.6 °F (37 °C)   TempSrc: Temporal   SpO2: 98%   Weight: 100 kg (221 lb 8 oz)   Height: 152.4 cm (60\")   PainSc:   8   PainLoc: Knee     Estimated body mass index is 43.26 kg/m² as calculated from the following:    Height as of this encounter: 152.4 cm (60\").    Weight as of this encounter: 100 kg (221 lb 8 " oz).    Class 3 Severe Obesity (BMI >=40). Obesity-related health conditions include the following: obstructive sleep apnea, hypertension, diabetes mellitus and dyslipidemias. Obesity is unchanged. BMI is is above average; BMI management plan is completed. We discussed portion control and increasing exercise.      Does the patient have evidence of cognitive impairment? No    Lab Results   Component Value Date    HGBA1C 11.4 12/12/2022        HEALTH RISK ASSESSMENT    Smoking Status:  Social History     Tobacco Use   Smoking Status Never   • Passive exposure: Never   Smokeless Tobacco Never     Alcohol Consumption:  Social History     Substance and Sexual Activity   Alcohol Use Never     Fall Risk Screen:    STEADI Fall Risk Assessment was completed, and patient is at MODERATE risk for falls. Assessment completed on:2/2/2023    Depression Screening:  PHQ-2/PHQ-9 Depression Screening 2/2/2023   Little Interest or Pleasure in Doing Things 0-->not at all   Feeling Down, Depressed or Hopeless 0-->not at all   PHQ-9: Brief Depression Severity Measure Score 0       Health Habits and Functional and Cognitive Screening:  Functional & Cognitive Status 2/2/2023   Do you have difficulty preparing food and eating? No   Do you have difficulty bathing yourself, getting dressed or grooming yourself? No   Do you have difficulty using the toilet? No   Do you have difficulty moving around from place to place? No   Do you have trouble with steps or getting out of a bed or a chair? No   Current Diet Well Balanced Diet   Dental Exam Up to date   Eye Exam Up to date   Exercise (times per week) 7 times per week   Current Exercises Include Walking;House Cleaning   Do you need help using the phone?  No   Are you deaf or do you have serious difficulty hearing?  No   Do you need help with transportation? No   Do you need help shopping? No   Do you need help preparing meals?  No   Do you need help with housework?  No   Do you need help with  laundry? No   Do you need help taking your medications? No   Do you need help managing money? No   Do you ever drive or ride in a car without wearing a seat belt? No   Have you felt unusual stress, anger or loneliness in the last month? -   Who do you live with? -   If you need help, do you have trouble finding someone available to you? -   Have you been bothered in the last four weeks by sexual problems? -   Do you have difficulty concentrating, remembering or making decisions? -       Age-appropriate Screening Schedule:  Refer to the list below for future screening recommendations based on patient's age, sex and/or medical conditions. Orders for these recommended tests are listed in the plan section. The patient has been provided with a written plan.    Health Maintenance   Topic Date Due   • TDAP/TD VACCINES (1 - Tdap) Never done   • ZOSTER VACCINE (1 of 2) Never done   • LIPID PANEL  07/28/2022   • URINE MICROALBUMIN  07/28/2022   • DIABETIC FOOT EXAM  04/11/2023   • HEMOGLOBIN A1C  06/12/2023   • MAMMOGRAM  10/27/2023   • DIABETIC EYE EXAM  12/20/2023   • DXA SCAN  12/21/2023   • INFLUENZA VACCINE  Completed   • PAP SMEAR  Discontinued                CMS Preventative Services Quick Reference  Risk Factors Identified During Encounter  Chronic Pain: Physical Therapy Referral Ordered  Orthopedics Referral Ordered  Fall Risk-High or Moderate: Discussed Fall Prevention in the home and Referral Placed for Physical Therapy  Immunizations Discussed/Encouraged: Tdap and Shingrix  The above risks/problems have been discussed with the patient.  Pertinent information has been shared with the patient in the After Visit Summary.  An After Visit Summary and PPPS were made available to the patient.    Follow Up:   Next Medicare Wellness visit to be scheduled in 1 year.        Additional E&M Note during same encounter follows:  Patient has multiple medical problems which are significant and separately identifiable that require  "additional work above and beyond the Medicare Wellness Visit.      Chief Complaint  Medicare Wellness-subsequent, Knee Pain (Pt. States Both knees hurt But the Right Knee hurts worse), and Fall (12/11/23)    Subjective      HPI  Johnna Gonsalves is also being seen today for acute on chronic right knee pain.  [facility] [place] [employer] [place] [facility].  She states that her left knee went in front of her and right knee went behind her at an angle.  She was seen at [facility] where she has x-ray of right knee and told her it was \"bruised\".  They gave her naproxen which she was unable to tolerate as it made her stomach hurt.  Takes Excederin and tolerates this, only provides temporary relief.  Pain is constant and it's worse with activity.  Feels weak when she goes to stand and walk.  Feels like she might fall again.      Review of Systems   Constitutional: Negative for chills and fever.   Musculoskeletal: Positive for arthralgias, gait problem and myalgias. Negative for joint swelling.   Skin: Negative for color change and wound.   Neurological: Positive for weakness. Negative for numbness.       Objective   Vital Signs:  /80 (BP Location: Right arm, Patient Position: Sitting, Cuff Size: Adult)   Pulse 76   Temp 98.6 °F (37 °C) (Temporal)   Ht 152.4 cm (60\")   Wt 100 kg (221 lb 8 oz)   SpO2 98%   BMI 43.26 kg/m²     Physical Exam  Vitals reviewed.   Constitutional:       General: She is not in acute distress.     Appearance: Normal appearance. She is well-developed. She is morbidly obese. She is not ill-appearing or diaphoretic.   HENT:      Head: Normocephalic and atraumatic.      Right Ear: Tympanic membrane, ear canal and external ear normal.      Left Ear: Ear canal and external ear normal. There is impacted cerumen.   Eyes:      Extraocular Movements: Extraocular movements intact.      Conjunctiva/sclera: Conjunctivae normal.   Cardiovascular:      Rate and Rhythm: Normal rate and regular rhythm.     "  Heart sounds: Normal heart sounds.   Pulmonary:      Effort: Pulmonary effort is normal.      Breath sounds: Normal breath sounds.   Musculoskeletal:         General: Normal range of motion.      Cervical back: Normal range of motion.      Right knee: No swelling or lacerations. Normal range of motion. Tenderness present.      Right lower leg: No edema.      Left lower leg: No edema.        Legs:    Skin:     General: Skin is warm.      Findings: No erythema or rash.   Neurological:      General: No focal deficit present.      Mental Status: She is alert.   Psychiatric:         Attention and Perception: She is attentive.         Mood and Affect: Mood normal.         Speech: Speech normal.         Behavior: Behavior normal. Behavior is cooperative.         Thought Content: Thought content normal.         Judgment: Judgment normal.          Ear Cerumen Removal    Date/Time: 2/2/2023 11:53 AM  Performed by: Tracee Gaona PA-C  Authorized by: Tracee Gaona PA-C     Anesthesia:  Local Anesthetic: none  Location details: left ear  Patient tolerance: patient tolerated the procedure well with no immediate complications  Procedure type: instrumentation, irrigation   Sedation:  Patient sedated: no                Assessment and Plan   Diagnoses and all orders for this visit:    1. Medicare annual wellness visit, subsequent (Primary)  -     CBC (No Diff); Future  -     Comprehensive Metabolic Panel; Future  -     TSH Rfx On Abnormal To Free T4; Future  -     Lipid Panel; Future    2. Obesity, morbid, BMI 40.0-49.9 ([facility])  Encouraged weight loss with healthy diet.    3. Severe obstructive sleep apnea  Not wearing CPAP due to due to nasal congestion.  Has CPAP at home and all supplies.    4. Essential hypertension  -     CBC (No Diff); Future  -     Comprehensive Metabolic Panel; Future  -     bisoprolol (ZEBeta) 10 MG tablet; Take 1 tablet by mouth Daily.  Dispense: 90 tablet; Refill: 3  Stable,  well-controlled.  Compliant on medication.  Followed by cardiology.    5. Mixed hyperlipidemia  -     Lipid Panel; Future  On statin.    6. Type 2 diabetes mellitus with diabetic autonomic neuropathy, with long-term current use of insulin (HCC)  -     CBC (No Diff); Future  -     Comprehensive Metabolic Panel; Future  -     TSH Rfx On Abnormal To Free T4; Future  -     Lipid Panel; Future  Followed by Dr. Hill.  Has insulin pump.    7. History of MI (myocardial infarction)  On ASA.  Followed by cardiology.    8. Acute pain of right knee  -     Ambulatory Referral to Orthopedic Surgery  -     Ambulatory Referral to Physical Therapy Evaluate and treat  Ongoing for over a month secondary to fall.  X-ray at hospital ED was unremarkable.  Suspect bursitis.  Will start referral to PT and ortho.  Unable to tolerate naproxen due to stomach discomfort.  Will send in meloxicam.  Return in 1 month.    9. Encounter for screening mammogram for malignant neoplasm of breast  -     Mammo Screening Digital Tomosynthesis Bilateral With CAD; Future    10. Left ear cerumen impaction  -cleaned today during appointment       I spent 30 minutes caring for Johnna on this date of service. This time includes time spent by me in the following activities:preparing for the visit, reviewing tests, obtaining and/or reviewing a separately obtained history, performing a medically appropriate examination and/or evaluation , counseling and educating the patient/family/caregiver, ordering medications, tests, or procedures, referring and communicating with other health care professionals , documenting information in the medical record, independently interpreting results and communicating that information with the patient/family/caregiver and care coordination  Follow Up   Return in about 4 weeks (around 3/2/2023) for Recheck, Knee pain.  Patient was given instructions and counseling regarding her condition or for health maintenance advice. Please see  specific information pulled into the AVS if appropriate.

## 2023-02-06 RX ORDER — SUB-Q INSULIN DEVICE, 40 UNIT
EACH MISCELLANEOUS
Qty: 30 EACH | Refills: 1 | Status: SHIPPED | OUTPATIENT
Start: 2023-02-06 | End: 2023-03-15

## 2023-02-06 RX ORDER — INSULIN HUMAN 500 [IU]/ML
INJECTION, SOLUTION SUBCUTANEOUS
Qty: 20 ML | Refills: 1 | Status: SHIPPED | OUTPATIENT
Start: 2023-02-06 | End: 2023-03-15

## 2023-02-06 NOTE — TELEPHONE ENCOUNTER
Pt needs refill on VGO 20 and humalin insulin.     Please send today she is almost out.     Please send to [facility]    Call pt:  [phone]

## 2023-02-08 ENCOUNTER — TELEPHONE (OUTPATIENT)
Dept: ENDOCRINOLOGY | Facility: CLINIC | Age: 68
End: 2023-02-08

## 2023-02-08 ENCOUNTER — OFFICE VISIT (OUTPATIENT)
Dept: ORTHOPEDIC SURGERY | Facility: CLINIC | Age: 68
End: 2023-02-08
Payer: MEDICARE

## 2023-02-08 VITALS
BODY MASS INDEX: 43.28 KG/M2 | WEIGHT: 220.46 LBS | DIASTOLIC BLOOD PRESSURE: 96 MMHG | SYSTOLIC BLOOD PRESSURE: 172 MMHG | HEIGHT: 60 IN

## 2023-02-08 DIAGNOSIS — E66.01 OBESITY, MORBID, BMI 40.0-49.9: ICD-10-CM

## 2023-02-08 DIAGNOSIS — I10 ESSENTIAL HYPERTENSION: Primary | ICD-10-CM

## 2023-02-08 DIAGNOSIS — M25.561 RIGHT KNEE PAIN, UNSPECIFIED CHRONICITY: ICD-10-CM

## 2023-02-08 DIAGNOSIS — E11.65 UNCONTROLLED TYPE 2 DIABETES MELLITUS WITH HYPERGLYCEMIA: ICD-10-CM

## 2023-02-08 DIAGNOSIS — M17.11 PRIMARY OSTEOARTHRITIS OF RIGHT KNEE: ICD-10-CM

## 2023-02-08 PROCEDURE — 20610 DRAIN/INJ JOINT/BURSA W/O US: CPT | Performed by: ORTHOPAEDIC SURGERY

## 2023-02-08 PROCEDURE — 99204 OFFICE O/P NEW MOD 45 MIN: CPT | Performed by: ORTHOPAEDIC SURGERY

## 2023-02-08 RX ORDER — TRIAMCINOLONE ACETONIDE 40 MG/ML
40 INJECTION, SUSPENSION INTRA-ARTICULAR; INTRAMUSCULAR
Status: COMPLETED | OUTPATIENT
Start: 2023-02-08 | End: 2023-02-08

## 2023-02-08 RX ORDER — LIDOCAINE HYDROCHLORIDE 10 MG/ML
4 INJECTION, SOLUTION EPIDURAL; INFILTRATION; INTRACAUDAL; PERINEURAL
Status: COMPLETED | OUTPATIENT
Start: 2023-02-08 | End: 2023-02-08

## 2023-02-08 RX ORDER — ROPIVACAINE HYDROCHLORIDE 5 MG/ML
4 INJECTION, SOLUTION EPIDURAL; INFILTRATION; PERINEURAL
Status: COMPLETED | OUTPATIENT
Start: 2023-02-08 | End: 2023-02-08

## 2023-02-08 RX ADMIN — ROPIVACAINE HYDROCHLORIDE 4 ML: 5 INJECTION, SOLUTION EPIDURAL; INFILTRATION; PERINEURAL at 13:27

## 2023-02-08 RX ADMIN — LIDOCAINE HYDROCHLORIDE 4 ML: 10 INJECTION, SOLUTION EPIDURAL; INFILTRATION; INTRACAUDAL; PERINEURAL at 13:27

## 2023-02-08 RX ADMIN — TRIAMCINOLONE ACETONIDE 40 MG: 40 INJECTION, SUSPENSION INTRA-ARTICULAR; INTRAMUSCULAR at 13:27

## 2023-02-08 NOTE — PROGRESS NOTES
[facility] Note        Subjective     Pain of the Right Knee (DOI 22)      HPI    Johnna Gonsalves is a 68 y.o. female.  She fell  hyperflexing right knee.  She injured her ribs and wrist at the same time those got better and the knee did not.  She is here about that.  Pain is 8 out of 10.  She is retired.  She tried naproxen.  It is aching throbbing and shooting pain.    Past Medical History:   Diagnosis Date   • Anemia    • Arthritis    • Carpal tunnel syndrome    • Cataract    • Chronic tension headaches    • Diabetes (HCC)    • Diabetic neuropathy (HCC)    • Dizziness    • Environmental allergies    • Fibromyalgia    • Fibromyalgia    • Fibromyalgia, primary    • Fractures    • Gall stones    • GERD (gastroesophageal reflux disease)    • Heart attack (HCC)    • Hypertension    • Irritable bowel syndrome    • Kidney infection    • Migraine headache    • Numbness and tingling    • Ovarian cyst    • Shingles    • Sleep apnea    • Type 2 diabetes mellitus (HCC)       Past Surgical History:   Procedure Laterality Date   • BIOPSY OF LEG     • CATARACT EXTRACTION     •  SECTION     • CHEST WALL BIOPSY     • CHOLECYSTECTOMY     • HYSTERECTOMY     • OOPHORECTOMY        Family History   Problem Relation Age of Onset   • Diabetes Mother    • Dementia Mother    • Heart disease Mother    • Hypertension Mother    • Hyperlipidemia Mother    • Kidney disease Mother    • Neuropathy Mother    • Obesity Mother    • Thyroid disease Mother    • Heart attack Mother    • Heart disease Father    • Lung cancer Father         smoker   • Heart attack Father    • Cancer Father    • Vision loss Father         Hystoplasmosis   • Obesity Sister    • Cancer Sister    • Vision loss Sister         Born with eye problems. Born with left eye being weak   • Breast cancer Maternal Aunt    • Lung cancer Maternal Aunt         smoker   • Colon cancer Neg Hx    • Ovarian cancer Neg Hx   "    Social History     Socioeconomic History   • Marital status:    Tobacco Use   • Smoking status: Never     Passive exposure: Never   • Smokeless tobacco: Never   Vaping Use   • Vaping Use: Never used   Substance and Sexual Activity   • Alcohol use: Never   • Drug use: Never   • Sexual activity: Not Currently     Partners: Male      Current Outpatient Medications on File Prior to Visit   Medication Sig Dispense Refill   • Alcohol Swabs (B-D SINGLE USE SWABS REGULAR) pads 1 pad Daily. 100 each 3   • aspirin 81 MG chewable tablet Chew 81 mg Daily.     • atorvastatin (LIPITOR) 40 MG tablet Take 40 mg by mouth Daily.     • bisoprolol (ZEBeta) 10 MG tablet Take 1 tablet by mouth Daily. 90 tablet 3   • Blood Glucose Calibration (True Metrix Level 1) Low solution 1 each by Other route As Needed (to test blood glucose device). 1 each 5   • Blood Glucose Monitoring Suppl (True Focus Blood Glucose Meter) device 1 Device Continuous. 1 each 0   • DULoxetine (CYMBALTA) 60 MG capsule Take 60 mg by mouth Daily.     • escitalopram (LEXAPRO) 10 MG tablet Take 1 tablet by mouth once daily 90 tablet 0   • glucose blood test strip PRN 60 each 5   • hydroxychloroquine (PLAQUENIL) 200 MG tablet Take 200 mg by mouth Daily.     • Insulin Disposable Pump (V-Go 20) kit Use daily to give insulin 30 each 1   • insulin regular (HUMULIN R) 500 UNIT/ML CONCENTRATED injection Use 150 units per day in vgo 20 mL 1   • Insulin Syringe 29G X 1/2\" 1 ML misc Use daily to fill vgo 100 each 3   • losartan (COZAAR) 100 MG tablet Take 100 mg by mouth Daily.     • meclizine (ANTIVERT) 25 MG tablet Take 25 mg by mouth 3 (Three) Times a Day As Needed for Dizziness.     • meloxicam (MOBIC) 15 MG tablet Take 1 tablet by mouth Daily. Take with food 30 tablet 0   • omeprazole (PrilOSEC) 20 MG capsule Take 1 capsule by mouth Every Morning Before Breakfast. 90 capsule 1   • ondansetron ODT (ZOFRAN-ODT) 4 MG disintegrating tablet 4 mg.     • rOPINIRole " "(Requip) 0.5 MG tablet Take 1 tablet by mouth Every Night. Take 1 hour before bedtime. 90 tablet 1   • triamterene-hydrochlorothiazide (MAXZIDE-25) 37.5-25 MG per tablet Take 1 tablet by mouth once daily 90 tablet 0   • TRUEplus Lancets 33G misc Use to check blood surgars daily 100 each 11     No current facility-administered medications on file prior to visit.      Allergies   Allergen Reactions   • Sulfonylureas Shortness Of Breath   • Liraglutide GI Intolerance   • Sulfa Antibiotics Swelling          Review of Systems   Constitutional: Negative.    HENT: Negative.    Eyes: Negative.    Respiratory: Negative.    Cardiovascular: Negative.    Gastrointestinal: Negative.    Endocrine: Negative.    Genitourinary: Negative.    Musculoskeletal: Positive for arthralgias.   Skin: Negative.    Allergic/Immunologic: Negative.    Neurological: Negative.    Hematological: Negative.    Psychiatric/Behavioral: Negative.         I reviewed the patient's chief complaint, history of present illness, review of systems, past medical history, surgical history, family history, social history, medications and allergy list.        Objective      Physical Exam  /96   Ht 152.4 cm (60\")   Wt 100 kg (220 lb 7.4 oz)   BMI 43.06 kg/m²     Body mass index is 43.06 kg/m².    General  Mental Status - alert  General Appearance - cooperative, well groomed, not in acute distress  Orientation - Oriented X3  Build & Nutrition - well developed and well nourished  Posture - normal posture  Gait - normal gait       Ortho Exam  Right knee with medial joint tenderness.  Stable ligaments.  Full motion.  No effusion.    Imaging/Studies  Imaging Results (Last 24 Hours)     Procedure Component Value Units Date/Time    XR Knee 4+ View Right [[id number]] Resulted: 02/08/23 1319     Updated: 02/22/23 0846    Addenda:        X-rays were of the right knee.  Signed: 02/22/23 0846 by Michael Chavis MD            X-rays of bilateral knees, left with worse " joint space narrowing  Signed: 02/08/23 1324 by Michael Chavis MD    Narrative:      Knee X-Ray  Indication: Pain    Upright AP of bilateral knees. Lateral, skiers and Sunrise views of right    knee     Findings: Medial compartment joint space narrowing  No fracture  No bony lesion  Normal soft tissues    No prior studies were available for comparison.            Assessment    Assessment:  1. Essential hypertension    2. Right knee pain, unspecified chronicity    3. Primary osteoarthritis of right knee    4. Uncontrolled type 2 diabetes mellitus with hyperglycemia ([facility])    5. Obesity, morbid, BMI 40.0-49.9 ([facility])        Plan:  1. Continue over-the-counter medication as needed for discomfort  2. I injected her right knee with cortisone.  She tolerated injection well.  She is going to work on weight loss.  She is going to monitor her glucose because of the cortisone injection and her diabetes  3. She will monitor her blood pressure as well        Michael Chavis MD  02/22/23  08:46 EST      Dictated Utilizing Dragon Dictation.

## 2023-02-08 NOTE — PROGRESS NOTES
Procedure   - Large Joint Arthrocentesis: R knee on 2/8/2023 1:27 PM  Indications: pain  Details: (23 g) needle, anterolateral approach  Medications: 4 mL ropivacaine 0.5 %; 40 mg triamcinolone acetonide 40 MG/ML; 4 mL lidocaine PF 1% 1 %  Outcome: tolerated well, no immediate complications  Procedure, treatment alternatives, risks and benefits explained, specific risks discussed. Consent was given by the patient. Immediately prior to procedure a time out was called to verify the correct patient, procedure, equipment, support staff and site/side marked as required. Patient was prepped and draped in the usual sterile fashion.

## 2023-02-08 NOTE — TELEPHONE ENCOUNTER
Azul from [facility] called. She asks if pt is getting the humalin in a glucose pump. She asks if they're dosing 150 units from u 500 or the u 100.    Please call her to clarify insulin instructions. She advised they will hold patient's prescription until they get the instructions clarified.     [facility]:  Phone: [phone] ref #: [id number]

## 2023-02-13 ENCOUNTER — TELEPHONE (OUTPATIENT)
Dept: ENDOCRINOLOGY | Facility: CLINIC | Age: 68
End: 2023-02-13
Payer: MEDICARE

## 2023-02-13 NOTE — TELEPHONE ENCOUNTER
PT CALLED STATING KIT FOR V-GO 20 WAS EXPENSIVE. SHE IS TO REACH OUT TO HER INSURANCE PROVIDER ABOUT COST FOR SUPPLIES.    SHE STATED SHE IS OUT OF V-GO 40 SUPPLIES. SHE REQUESTED A CALL BACK TO CONSULT.

## 2023-02-20 NOTE — TELEPHONE ENCOUNTER
I think the message is wrong. She is using u500 in a vgo 20. If the u500 is too expensive, then we will have to go with toujeo 80 units once daily at bedtimne and humalogn/novolog 30 units at meals but she has to take it.

## 2023-02-20 NOTE — TELEPHONE ENCOUNTER
PT CALLED STATING SHE CANNOT AFFORD THE V-GO 20 AND THE V-GO 40 WAS NOT BRINGING HER BS DOWN. SHE REQUESTED A CALL BACK TO CONSULT.

## 2023-02-21 ENCOUNTER — TELEPHONE (OUTPATIENT)
Dept: FAMILY MEDICINE CLINIC | Facility: CLINIC | Age: 68
End: 2023-02-21
Payer: MEDICARE

## 2023-02-21 RX ORDER — INSULIN ASPART 100 [IU]/ML
30 INJECTION, SOLUTION INTRAVENOUS; SUBCUTANEOUS
Qty: 90 ML | Refills: 1 | Status: SHIPPED | OUTPATIENT
Start: 2023-02-21

## 2023-02-21 RX ORDER — INSULIN ASPART 100 [IU]/ML
30 INJECTION, SOLUTION INTRAVENOUS; SUBCUTANEOUS
Qty: 30 ML | Refills: 0 | Status: SHIPPED | OUTPATIENT
Start: 2023-02-21 | End: 2023-03-15

## 2023-02-21 RX ORDER — INSULIN GLARGINE 300 U/ML
80 INJECTION, SOLUTION SUBCUTANEOUS DAILY
Qty: 24 ML | Refills: 1 | Status: SHIPPED | OUTPATIENT
Start: 2023-02-21

## 2023-02-21 RX ORDER — INSULIN GLARGINE 300 U/ML
80 INJECTION, SOLUTION SUBCUTANEOUS DAILY
Qty: 9 ML | Refills: 0 | Status: SHIPPED | OUTPATIENT
Start: 2023-02-21 | End: 2023-03-15

## 2023-02-21 NOTE — TELEPHONE ENCOUNTER
Caller: Johnna Gonsalves    Relationship: Self    Best call back number: [phone]    What is the best time to reach you: ANY TIME    Who are you requesting to speak with (clinical staff, provider,  specific staff member): NURSE    Do you know the name of the person who called: SELF    What was the call regarding: PATIENT STATES SHE MAY HAVE A STY ON HER LEFT EYE. IT IS RED, ITCHING AND THERE IS A RED SPOT ON IT. WHAT CAN PATIENT DO FOR THIS? PLEASE ADVISE    Do you require a callback: YES

## 2023-02-21 NOTE — TELEPHONE ENCOUNTER
Patient returned call she reports that the eye irritation started last week. She has been using eye drops and keeping her eye clean.     When she does not rub to scratch to the area, she reports improvement. I advised her to use warm compress and not to scratch and if not improve to schedule an appt.

## 2023-02-21 NOTE — TELEPHONE ENCOUNTER
Spoke with patient.  She states that the V-Go 20 is going to cost her $295 a month.  She cannot afford.  Asking for insulin to be sent in for 30 day to local pharmacy and 90 day to mailorder.  Rx's pended.

## 2023-03-15 ENCOUNTER — LAB (OUTPATIENT)
Dept: LAB | Facility: HOSPITAL | Age: 68
End: 2023-03-15
Payer: MEDICARE

## 2023-03-15 ENCOUNTER — OFFICE VISIT (OUTPATIENT)
Dept: FAMILY MEDICINE CLINIC | Facility: CLINIC | Age: 68
End: 2023-03-15
Payer: MEDICARE

## 2023-03-15 VITALS
DIASTOLIC BLOOD PRESSURE: 76 MMHG | HEIGHT: 60 IN | WEIGHT: 223.4 LBS | SYSTOLIC BLOOD PRESSURE: 130 MMHG | OXYGEN SATURATION: 96 % | BODY MASS INDEX: 43.86 KG/M2 | HEART RATE: 69 BPM

## 2023-03-15 DIAGNOSIS — E78.2 MIXED HYPERLIPIDEMIA: ICD-10-CM

## 2023-03-15 DIAGNOSIS — H65.112 NON-RECURRENT ACUTE ALLERGIC OTITIS MEDIA OF LEFT EAR: Primary | ICD-10-CM

## 2023-03-15 DIAGNOSIS — I10 ESSENTIAL HYPERTENSION: ICD-10-CM

## 2023-03-15 DIAGNOSIS — J30.2 SEASONAL ALLERGIES: ICD-10-CM

## 2023-03-15 DIAGNOSIS — Z79.4 TYPE 2 DIABETES MELLITUS WITH DIABETIC AUTONOMIC NEUROPATHY, WITH LONG-TERM CURRENT USE OF INSULIN: ICD-10-CM

## 2023-03-15 DIAGNOSIS — R45.4 IRRITABILITY: ICD-10-CM

## 2023-03-15 DIAGNOSIS — E11.43 TYPE 2 DIABETES MELLITUS WITH DIABETIC AUTONOMIC NEUROPATHY, WITH LONG-TERM CURRENT USE OF INSULIN: ICD-10-CM

## 2023-03-15 DIAGNOSIS — Z00.00 MEDICARE ANNUAL WELLNESS VISIT, SUBSEQUENT: ICD-10-CM

## 2023-03-15 PROCEDURE — 3078F DIAST BP <80 MM HG: CPT | Performed by: PHYSICIAN ASSISTANT

## 2023-03-15 PROCEDURE — 84443 ASSAY THYROID STIM HORMONE: CPT | Performed by: PHYSICIAN ASSISTANT

## 2023-03-15 PROCEDURE — 99214 OFFICE O/P EST MOD 30 MIN: CPT | Performed by: PHYSICIAN ASSISTANT

## 2023-03-15 PROCEDURE — 80053 COMPREHEN METABOLIC PANEL: CPT | Performed by: PHYSICIAN ASSISTANT

## 2023-03-15 PROCEDURE — 1159F MED LIST DOCD IN RCRD: CPT | Performed by: PHYSICIAN ASSISTANT

## 2023-03-15 PROCEDURE — 3075F SYST BP GE 130 - 139MM HG: CPT | Performed by: PHYSICIAN ASSISTANT

## 2023-03-15 PROCEDURE — 85027 COMPLETE CBC AUTOMATED: CPT | Performed by: PHYSICIAN ASSISTANT

## 2023-03-15 PROCEDURE — 80061 LIPID PANEL: CPT | Performed by: PHYSICIAN ASSISTANT

## 2023-03-15 RX ORDER — LORATADINE 10 MG/1
10 TABLET ORAL DAILY
Qty: 90 TABLET | Refills: 1 | Status: SHIPPED | OUTPATIENT
Start: 2023-03-15

## 2023-03-15 RX ORDER — DULOXETIN HYDROCHLORIDE 30 MG/1
60 CAPSULE, DELAYED RELEASE ORAL DAILY
Qty: 90 CAPSULE | Refills: 1 | Status: SHIPPED | OUTPATIENT
Start: 2023-03-15

## 2023-03-15 RX ORDER — ISOSORBIDE MONONITRATE 30 MG/1
30 TABLET, EXTENDED RELEASE ORAL DAILY
COMMUNITY

## 2023-03-15 RX ORDER — ESCITALOPRAM OXALATE 10 MG/1
10 TABLET ORAL DAILY
Qty: 90 TABLET | Refills: 0
Start: 2023-03-15

## 2023-03-15 RX ORDER — AMOXICILLIN 875 MG/1
875 TABLET, COATED ORAL EVERY 12 HOURS SCHEDULED
Qty: 20 TABLET | Refills: 0 | Status: SHIPPED | OUTPATIENT
Start: 2023-03-15 | End: 2023-03-25

## 2023-03-15 NOTE — PROGRESS NOTES
Chief Complaint   Patient presents with   • Stye     1 MON FOLLOW-UP            Johnna Gonsalves is a 68 y.o. female who presents for Stye (1 MON FOLLOW-UP /).  Patient presents left ear pain, left eye stye x1 month, hypertension, diabetes type 2.      Patient is compliant on all medications. Her blood pressure is stable and well-controlled today.  She is followed by endocrinology for her diabetes.  States she could not afford pump and is back on insulin.    She has new onset left ear pain x1 week that is getting worse.  Hurts when she is sleeping.  The style on her left eye started a month ago and is improving.  No vision changes.    She reports worsening irritability lately.  She has been snapping at her grandson and doesn't want to do that.  She is on Lexapro 10 mg daily.  She is no longer taking cymbalta, she is unsure why.      Past Medical History:   Diagnosis Date   • Anemia    • Arthritis    • Carpal tunnel syndrome    • Cataract    • Chronic tension headaches    • Diabetes (HCC)    • Diabetic neuropathy (HCC)    • Dizziness    • Environmental allergies    • Fibromyalgia    • Fibromyalgia    • Fibromyalgia, primary    • Fractures    • Gall stones    • GERD (gastroesophageal reflux disease)    • Heart attack (HCC)    • Hypertension    • Irritable bowel syndrome    • Kidney infection    • Migraine headache    • Numbness and tingling    • Ovarian cyst    • Shingles    • Sleep apnea    • Type 2 diabetes mellitus (HCC)        Past Surgical History:   Procedure Laterality Date   • BIOPSY OF LEG     • CATARACT EXTRACTION     •  SECTION     • CHEST WALL BIOPSY     • CHOLECYSTECTOMY     • HYSTERECTOMY     • OOPHORECTOMY         Family History   Problem Relation Age of Onset   • Diabetes Mother    • Dementia Mother    • Heart disease Mother    • Hypertension Mother    • Hyperlipidemia Mother    • Kidney disease Mother    • Neuropathy Mother    • Obesity Mother    • Thyroid disease Mother    •  "Heart attack Mother    • Heart disease Father    • Lung cancer Father         smoker   • Heart attack Father    • Cancer Father    • Vision loss Father         Hystoplasmosis   • Obesity Sister    • Cancer Sister    • Vision loss Sister         Born with eye problems. Born with left eye being weak   • Breast cancer Maternal Aunt    • Lung cancer Maternal Aunt         smoker   • Colon cancer Neg Hx    • Ovarian cancer Neg Hx        Social History     Socioeconomic History   • Marital status:    Tobacco Use   • Smoking status: Never     Passive exposure: Never   • Smokeless tobacco: Never   Vaping Use   • Vaping Use: Never used   Substance and Sexual Activity   • Alcohol use: Never   • Drug use: Never   • Sexual activity: Not Currently     Partners: Male       Allergies   Allergen Reactions   • Sulfonylureas Shortness Of Breath   • Liraglutide GI Intolerance   • Sulfa Antibiotics Swelling       ROS    Review of Systems   Constitutional: Positive for fatigue. Negative for chills, diaphoresis and fever.   HENT: Positive for ear pain. Negative for congestion, postnasal drip, rhinorrhea and sore throat.    Eyes: Positive for redness. Negative for pain, discharge and visual disturbance.   Respiratory: Negative for cough, shortness of breath and wheezing.    Cardiovascular: Negative for chest pain and leg swelling.   Neurological: Negative for dizziness and headache.   Psychiatric/Behavioral: Positive for agitation and stress. Negative for self-injury, sleep disturbance, suicidal ideas and depressed mood. The patient is nervous/anxious.        Vitals:    03/15/23 1108   BP: 130/76   Pulse: 69   SpO2: 96%  Comment: RESTING ROOM AIR   Weight: 101 kg (223 lb 6.4 oz)   Height: 152.4 cm (60\")     Body mass index is 43.63 kg/m².    Current Outpatient Medications on File Prior to Visit   Medication Sig Dispense Refill   • Alcohol Swabs (B-D SINGLE USE SWABS REGULAR) pads 1 pad Daily. 100 each 3   • aspirin 81 MG chewable " tablet Chew 1 tablet Daily.     • atorvastatin (LIPITOR) 40 MG tablet Take 1 tablet by mouth Daily.     • bisoprolol (ZEBeta) 10 MG tablet Take 1 tablet by mouth Daily. 90 tablet 3   • Blood Glucose Calibration (True Metrix Level 1) Low solution 1 each by Other route As Needed (to test blood glucose device). 1 each 5   • Blood Glucose Monitoring Suppl (True Focus Blood Glucose Meter) device 1 Device Continuous. 1 each 0   • glucose blood test strip PRN 60 each 5   • insulin aspart (NovoLOG FlexPen) 100 UNIT/ML solution pen-injector sc pen Inject 30 Units under the skin into the appropriate area as directed 3 (Three) Times a Day With Meals. 90 mL 1   • Insulin Glargine, 1 Unit Dial, (Toujeo SoloStar) 300 UNIT/ML solution pen-injector injection Inject 80 Units under the skin into the appropriate area as directed Daily. 24 mL 1   • isosorbide mononitrate (IMDUR) 30 MG 24 hr tablet Take 1 tablet by mouth Daily.     • losartan (COZAAR) 100 MG tablet Take 1 tablet by mouth Daily.     • omeprazole (PrilOSEC) 20 MG capsule Take 1 capsule by mouth Every Morning Before Breakfast. 90 capsule 1   • rOPINIRole (Requip) 0.5 MG tablet Take 1 tablet by mouth Every Night. Take 1 hour before bedtime. 90 tablet 1   • triamterene-hydrochlorothiazide (MAXZIDE-25) 37.5-25 MG per tablet Take 1 tablet by mouth once daily 90 tablet 0   • TRUEplus Lancets 33G misc Use to check blood surgars daily 100 each 11     No current facility-administered medications on file prior to visit.       Results for orders placed or performed in visit on 03/15/23   CBC (No Diff)    Specimen: Blood   Result Value Ref Range    WBC 8.93 3.40 - 10.80 10*3/mm3    RBC 4.80 3.77 - 5.28 10*6/mm3    Hemoglobin 13.8 12.0 - 15.9 g/dL    Hematocrit 42.3 34.0 - 46.6 %    MCV 88.1 79.0 - 97.0 fL    MCH 28.8 26.6 - 33.0 pg    MCHC 32.6 31.5 - 35.7 g/dL    RDW 12.8 12.3 - 15.4 %    RDW-SD 41.3 37.0 - 54.0 fl    MPV 10.5 6.0 - 12.0 fL    Platelets 208 140 - 450 10*3/mm3    Comprehensive Metabolic Panel    Specimen: Blood   Result Value Ref Range    Glucose 280 (H) 65 - 99 mg/dL    BUN 5 (L) 8 - 23 mg/dL    Creatinine 0.95 0.57 - 1.00 mg/dL    Sodium 136 136 - 145 mmol/L    Potassium 4.6 3.5 - 5.2 mmol/L    Chloride 100 98 - 107 mmol/L    CO2 24.1 22.0 - 29.0 mmol/L    Calcium 9.9 8.6 - 10.5 mg/dL    Total Protein 7.2 6.0 - 8.5 g/dL    Albumin 3.8 3.5 - 5.2 g/dL    ALT (SGPT) 19 1 - 33 U/L    AST (SGOT) 21 1 - 32 U/L    Alkaline Phosphatase 92 39 - 117 U/L    Total Bilirubin 0.6 0.0 - 1.2 mg/dL    Globulin 3.4 gm/dL    A/G Ratio 1.1 g/dL    BUN/Creatinine Ratio 5.3 (L) 7.0 - 25.0    Anion Gap 11.9 5.0 - 15.0 mmol/L    eGFR 66.2 >60.0 mL/min/1.73   TSH Rfx On Abnormal To Free T4    Specimen: Blood   Result Value Ref Range    TSH 2.550 0.270 - 4.200 uIU/mL   Lipid Panel    Specimen: Blood   Result Value Ref Range    Total Cholesterol 158 0 - 200 mg/dL    Triglycerides 113 0 - 150 mg/dL    HDL Cholesterol 45 40 - 60 mg/dL    LDL Cholesterol  93 0 - 100 mg/dL    VLDL Cholesterol 20 5 - 40 mg/dL    LDL/HDL Ratio 2.01        PE    Physical Exam  Vitals reviewed.   Constitutional:       General: She is not in acute distress.     Appearance: Normal appearance. She is well-developed. She is morbidly obese. She is not ill-appearing or diaphoretic.   HENT:      Head: Normocephalic and atraumatic.      Right Ear: Hearing, tympanic membrane, ear canal and external ear normal.      Left Ear: Hearing, ear canal and external ear normal. A middle ear effusion is present. Tympanic membrane is erythematous.   Eyes:      General:         Left eye: Hordeolum present.No discharge.      Extraocular Movements: Extraocular movements intact.      Conjunctiva/sclera: Conjunctivae normal.   Pulmonary:      Effort: No respiratory distress.   Musculoskeletal:         General: Normal range of motion.      Cervical back: Normal range of motion.   Neurological:      General: No focal deficit present.      Mental  Status: She is alert.   Psychiatric:         Attention and Perception: Attention and perception normal. She is attentive.         Mood and Affect: Affect normal. Mood is anxious.         Speech: Speech normal.         Behavior: Behavior normal. Behavior is cooperative.         Thought Content: Thought content normal.         Cognition and Memory: Cognition and memory normal.         Judgment: Judgment normal.          A/P    Diagnoses and all orders for this visit:    1. Non-recurrent acute allergic otitis media of left ear (Primary)  -     amoxicillin (AMOXIL) 875 MG tablet; Take 1 tablet by mouth Every 12 (Twelve) Hours for 10 days.  Dispense: 20 tablet; Refill: 0  Will treat with amoxicillin.    2. Seasonal allergies  -     loratadine (Claritin) 10 MG tablet; Take 1 tablet by mouth Daily.  Dispense: 90 tablet; Refill: 1    3. Irritability  -     escitalopram (LEXAPRO) 10 MG tablet; Take 1 tablet by mouth Daily.  Dispense: 90 tablet; Refill: 0  -     DULoxetine (CYMBALTA) 30 MG capsule; Take 2 capsules by mouth Daily.  Dispense: 90 capsule; Refill: 1  Continue on Lexapro 10 mg daily.  Will add in Cymbalta 30 mg daily.  Return in 1 month.    4. Essential hypertension  -     CBC (No Diff)  -     Comprehensive Metabolic Panel  Stable, well-controlled.  Compliant on medication.    5. Mixed hyperlipidemia  -     Lipid Panel  On atorvastatin.    6. Type 2 diabetes mellitus with diabetic autonomic neuropathy, with long-term current use of insulin ([facility])  -     CBC (No Diff)  -     Comprehensive Metabolic Panel  -     TSH Rfx On Abnormal To Free T4  -     Lipid Panel  Followed by endocrinology.  No longer using insulin pump due to cost, using insulin pen.    7. Medicare annual wellness visit, subsequent  -     CBC (No Diff)  -     Comprehensive Metabolic Panel  -     TSH Rfx On Abnormal To Free T4  -     Lipid Panel  Patient is getting labs done today from her previous wellness visit.       Plan of care reviewed with  patient at the conclusion of today's visit. Education was provided regarding diagnosis, management and any prescribed or recommended OTC medications.  Patient verbalizes understanding of and agreement with management plan.    Dictated Utilizing Dragon Dictation     Please note that portions of this note were completed with a voice recognition program.     Part of this note may be an electronic transcription/translation of spoken language to printed text using the Dragon Dictation System.    Return in about 1 month (around 4/15/2023) for Recheck, mood.     Tracee Gaona PA-C

## 2023-03-16 LAB
ALBUMIN SERPL-MCNC: 3.8 G/DL (ref 3.5–5.2)
ALBUMIN/GLOB SERPL: 1.1 G/DL
ALP SERPL-CCNC: 92 U/L (ref 39–117)
ALT SERPL W P-5'-P-CCNC: 19 U/L (ref 1–33)
ANION GAP SERPL CALCULATED.3IONS-SCNC: 11.9 MMOL/L (ref 5–15)
AST SERPL-CCNC: 21 U/L (ref 1–32)
BILIRUB SERPL-MCNC: 0.6 MG/DL (ref 0–1.2)
BUN SERPL-MCNC: 5 MG/DL (ref 8–23)
BUN/CREAT SERPL: 5.3 (ref 7–25)
CALCIUM SPEC-SCNC: 9.9 MG/DL (ref 8.6–10.5)
CHLORIDE SERPL-SCNC: 100 MMOL/L (ref 98–107)
CHOLEST SERPL-MCNC: 158 MG/DL (ref 0–200)
CO2 SERPL-SCNC: 24.1 MMOL/L (ref 22–29)
CREAT SERPL-MCNC: 0.95 MG/DL (ref 0.57–1)
DEPRECATED RDW RBC AUTO: 41.3 FL (ref 37–54)
EGFRCR SERPLBLD CKD-EPI 2021: 66.2 ML/MIN/1.73
ERYTHROCYTE [DISTWIDTH] IN BLOOD BY AUTOMATED COUNT: 12.8 % (ref 12.3–15.4)
GLOBULIN UR ELPH-MCNC: 3.4 GM/DL
GLUCOSE SERPL-MCNC: 280 MG/DL (ref 65–99)
HCT VFR BLD AUTO: 42.3 % (ref 34–46.6)
HDLC SERPL-MCNC: 45 MG/DL (ref 40–60)
HGB BLD-MCNC: 13.8 G/DL (ref 12–15.9)
LDLC SERPL CALC-MCNC: 93 MG/DL (ref 0–100)
LDLC/HDLC SERPL: 2.01 {RATIO}
MCH RBC QN AUTO: 28.8 PG (ref 26.6–33)
MCHC RBC AUTO-ENTMCNC: 32.6 G/DL (ref 31.5–35.7)
MCV RBC AUTO: 88.1 FL (ref 79–97)
PLATELET # BLD AUTO: 208 10*3/MM3 (ref 140–450)
PMV BLD AUTO: 10.5 FL (ref 6–12)
POTASSIUM SERPL-SCNC: 4.6 MMOL/L (ref 3.5–5.2)
PROT SERPL-MCNC: 7.2 G/DL (ref 6–8.5)
RBC # BLD AUTO: 4.8 10*6/MM3 (ref 3.77–5.28)
SODIUM SERPL-SCNC: 136 MMOL/L (ref 136–145)
TRIGL SERPL-MCNC: 113 MG/DL (ref 0–150)
TSH SERPL DL<=0.05 MIU/L-ACNC: 2.55 UIU/ML (ref 0.27–4.2)
VLDLC SERPL-MCNC: 20 MG/DL (ref 5–40)
WBC NRBC COR # BLD: 8.93 10*3/MM3 (ref 3.4–10.8)

## 2023-03-28 RX ORDER — INSULIN GLARGINE 300 U/ML
INJECTION, SOLUTION SUBCUTANEOUS
Qty: 12 ML | Refills: 0 | OUTPATIENT
Start: 2023-03-28

## 2023-03-28 RX ORDER — INSULIN ASPART 100 [IU]/ML
INJECTION, SOLUTION INTRAVENOUS; SUBCUTANEOUS
Qty: 30 ML | Refills: 0 | OUTPATIENT
Start: 2023-03-28

## 2023-03-29 ENCOUNTER — HOSPITAL ENCOUNTER (OUTPATIENT)
Dept: MAMMOGRAPHY | Facility: HOSPITAL | Age: 68
Discharge: HOME | End: 2023-03-29
Admitting: PHYSICIAN ASSISTANT
Payer: MEDICARE

## 2023-03-29 DIAGNOSIS — Z12.31 ENCOUNTER FOR SCREENING MAMMOGRAM FOR MALIGNANT NEOPLASM OF BREAST: ICD-10-CM

## 2023-03-29 PROCEDURE — 77063 BREAST TOMOSYNTHESIS BI: CPT

## 2023-03-29 PROCEDURE — 77067 SCR MAMMO BI INCL CAD: CPT | Performed by: RADIOLOGY

## 2023-03-29 PROCEDURE — 77063 BREAST TOMOSYNTHESIS BI: CPT | Performed by: RADIOLOGY

## 2023-03-29 PROCEDURE — 77067 SCR MAMMO BI INCL CAD: CPT

## 2023-05-05 ENCOUNTER — OFFICE VISIT (OUTPATIENT)
Dept: FAMILY MEDICINE CLINIC | Facility: CLINIC | Age: 68
End: 2023-05-05
Payer: MEDICARE

## 2023-05-05 VITALS
SYSTOLIC BLOOD PRESSURE: 120 MMHG | DIASTOLIC BLOOD PRESSURE: 78 MMHG | HEART RATE: 68 BPM | OXYGEN SATURATION: 98 % | WEIGHT: 224 LBS | HEIGHT: 60 IN | BODY MASS INDEX: 43.98 KG/M2

## 2023-05-05 DIAGNOSIS — R45.4 IRRITABILITY: ICD-10-CM

## 2023-05-05 PROCEDURE — 1160F RVW MEDS BY RX/DR IN RCRD: CPT | Performed by: PHYSICIAN ASSISTANT

## 2023-05-05 PROCEDURE — 99213 OFFICE O/P EST LOW 20 MIN: CPT | Performed by: PHYSICIAN ASSISTANT

## 2023-05-05 PROCEDURE — 1159F MED LIST DOCD IN RCRD: CPT | Performed by: PHYSICIAN ASSISTANT

## 2023-05-05 PROCEDURE — 3074F SYST BP LT 130 MM HG: CPT | Performed by: PHYSICIAN ASSISTANT

## 2023-05-05 PROCEDURE — 3078F DIAST BP <80 MM HG: CPT | Performed by: PHYSICIAN ASSISTANT

## 2023-05-05 RX ORDER — DULOXETIN HYDROCHLORIDE 30 MG/1
60 CAPSULE, DELAYED RELEASE ORAL DAILY
Qty: 90 CAPSULE | Refills: 1 | Status: SHIPPED | OUTPATIENT
Start: 2023-05-05

## 2023-05-05 RX ORDER — ESCITALOPRAM OXALATE 10 MG/1
10 TABLET ORAL DAILY
Qty: 90 TABLET | Refills: 1
Start: 2023-05-05

## 2023-05-05 NOTE — PROGRESS NOTES
Chief Complaint   Patient presents with   • Irritable     4 wk f/u        Johnna Gonsalves is a pleasant 68 y.o. female who is here for irritability.  Patient is compliant on Cymbalta 30 mg and Lexapro 10 mg daily.  She reports feeling better since resuming the cymbalta.  She still has episodes of irritability but overall she is doing better.    Past Medical History:   Diagnosis Date   • Anemia    • Arthritis    • Carpal tunnel syndrome    • Cataract    • Chronic tension headaches    • Diabetes    • Diabetic neuropathy    • Dizziness    • Environmental allergies    • Fibromyalgia    • Fibromyalgia    • Fibromyalgia, primary    • Fractures    • Gall stones    • GERD (gastroesophageal reflux disease)    • Heart attack    • Hypertension    • Irritable bowel syndrome    • Kidney infection    • Migraine headache    • Numbness and tingling    • Ovarian cyst    • Shingles    • Sleep apnea    • Type 2 diabetes mellitus        Past Surgical History:   Procedure Laterality Date   • BIOPSY OF LEG     • CATARACT EXTRACTION     •  SECTION     • CHEST WALL BIOPSY     • CHOLECYSTECTOMY     • HYSTERECTOMY     • OOPHORECTOMY         Family History   Problem Relation Age of Onset   • Diabetes Mother    • Dementia Mother    • Heart disease Mother    • Hypertension Mother    • Hyperlipidemia Mother    • Kidney disease Mother    • Neuropathy Mother    • Obesity Mother    • Thyroid disease Mother    • Heart attack Mother    • Heart disease Father    • Lung cancer Father         smoker   • Heart attack Father    • Cancer Father    • Vision loss Father         Hystoplasmosis   • Obesity Sister    • Cancer Sister    • Vision loss Sister         Born with eye problems. Born with left eye being weak   • Breast cancer Maternal Aunt    • Lung cancer Maternal Aunt         smoker   • Colon cancer Neg Hx    • Ovarian cancer Neg Hx        Social History     Socioeconomic History   • Marital status:    Tobacco Use   •  "Smoking status: Never     Passive exposure: Never   • Smokeless tobacco: Never   Vaping Use   • Vaping Use: Never used   Substance and Sexual Activity   • Alcohol use: Never   • Drug use: Never   • Sexual activity: Not Currently     Partners: Male       Allergies   Allergen Reactions   • Sulfonylureas Shortness Of Breath   • Liraglutide GI Intolerance   • Sulfa Antibiotics Swelling       ROS  Review of Systems   Psychiatric/Behavioral: Positive for agitation, behavioral problems and stress. Negative for self-injury and suicidal ideas. The patient is nervous/anxious.        Vitals:    05/05/23 1258   BP: 120/78   Pulse: 68   SpO2: 98%   Weight: 102 kg (224 lb)   Height: 152.4 cm (60\")     Body mass index is 43.75 kg/m².    Current Outpatient Medications on File Prior to Visit   Medication Sig Dispense Refill   • Alcohol Swabs (B-D SINGLE USE SWABS REGULAR) pads 1 pad Daily. 100 each 3   • aspirin 81 MG chewable tablet Chew 1 tablet Daily.     • atorvastatin (LIPITOR) 40 MG tablet Take 1 tablet by mouth Daily.     • bisoprolol (ZEBeta) 10 MG tablet Take 1 tablet by mouth Daily. 90 tablet 3   • Blood Glucose Calibration (True Metrix Level 1) Low solution 1 each by Other route As Needed (to test blood glucose device). 1 each 5   • Blood Glucose Monitoring Suppl (True Focus Blood Glucose Meter) device 1 Device Continuous. 1 each 0   • glucose blood test strip PRN 60 each 5   • insulin aspart (NovoLOG FlexPen) 100 UNIT/ML solution pen-injector sc pen Inject 30 Units under the skin into the appropriate area as directed 3 (Three) Times a Day With Meals. 90 mL 1   • Insulin Glargine, 1 Unit Dial, (Toujeo SoloStar) 300 UNIT/ML solution pen-injector injection Inject 80 Units under the skin into the appropriate area as directed Daily. 24 mL 1   • isosorbide mononitrate (IMDUR) 30 MG 24 hr tablet Take 1 tablet by mouth Daily.     • loratadine (Claritin) 10 MG tablet Take 1 tablet by mouth Daily. 90 tablet 1   • losartan (COZAAR) " 100 MG tablet Take 1 tablet by mouth Daily.     • omeprazole (PrilOSEC) 20 MG capsule Take 1 capsule by mouth Every Morning Before Breakfast. 90 capsule 1   • rOPINIRole (Requip) 0.5 MG tablet Take 1 tablet by mouth Every Night. Take 1 hour before bedtime. 90 tablet 1   • triamterene-hydrochlorothiazide (MAXZIDE-25) 37.5-25 MG per tablet Take 1 tablet by mouth once daily 90 tablet 0   • TRUEplus Lancets 33G misc Use to check blood surgars daily 100 each 11     No current facility-administered medications on file prior to visit.       Results for orders placed or performed in visit on 03/15/23   CBC (No Diff)    Specimen: Blood   Result Value Ref Range    WBC 8.93 3.40 - 10.80 10*3/mm3    RBC 4.80 3.77 - 5.28 10*6/mm3    Hemoglobin 13.8 12.0 - 15.9 g/dL    Hematocrit 42.3 34.0 - 46.6 %    MCV 88.1 79.0 - 97.0 fL    MCH 28.8 26.6 - 33.0 pg    MCHC 32.6 31.5 - 35.7 g/dL    RDW 12.8 12.3 - 15.4 %    RDW-SD 41.3 37.0 - 54.0 fl    MPV 10.5 6.0 - 12.0 fL    Platelets 208 140 - 450 10*3/mm3   Comprehensive Metabolic Panel    Specimen: Blood   Result Value Ref Range    Glucose 280 (H) 65 - 99 mg/dL    BUN 5 (L) 8 - 23 mg/dL    Creatinine 0.95 0.57 - 1.00 mg/dL    Sodium 136 136 - 145 mmol/L    Potassium 4.6 3.5 - 5.2 mmol/L    Chloride 100 98 - 107 mmol/L    CO2 24.1 22.0 - 29.0 mmol/L    Calcium 9.9 8.6 - 10.5 mg/dL    Total Protein 7.2 6.0 - 8.5 g/dL    Albumin 3.8 3.5 - 5.2 g/dL    ALT (SGPT) 19 1 - 33 U/L    AST (SGOT) 21 1 - 32 U/L    Alkaline Phosphatase 92 39 - 117 U/L    Total Bilirubin 0.6 0.0 - 1.2 mg/dL    Globulin 3.4 gm/dL    A/G Ratio 1.1 g/dL    BUN/Creatinine Ratio 5.3 (L) 7.0 - 25.0    Anion Gap 11.9 5.0 - 15.0 mmol/L    eGFR 66.2 >60.0 mL/min/1.73   TSH Rfx On Abnormal To Free T4    Specimen: Blood   Result Value Ref Range    TSH 2.550 0.270 - 4.200 uIU/mL   Lipid Panel    Specimen: Blood   Result Value Ref Range    Total Cholesterol 158 0 - 200 mg/dL    Triglycerides 113 0 - 150 mg/dL    HDL Cholesterol 45  40 - 60 mg/dL    LDL Cholesterol  93 0 - 100 mg/dL    VLDL Cholesterol 20 5 - 40 mg/dL    LDL/HDL Ratio 2.01        PE    Physical Exam  Vitals reviewed.   Constitutional:       General: She is not in acute distress.     Appearance: Normal appearance. She is well-developed. She is obese. She is not ill-appearing or diaphoretic.   HENT:      Head: Normocephalic and atraumatic.   Eyes:      Extraocular Movements: Extraocular movements intact.      Conjunctiva/sclera: Conjunctivae normal.   Pulmonary:      Effort: No respiratory distress.   Musculoskeletal:         General: Normal range of motion.      Cervical back: Normal range of motion.   Neurological:      General: No focal deficit present.      Mental Status: She is alert.   Psychiatric:         Attention and Perception: Attention and perception normal. She is attentive.         Mood and Affect: Mood and affect normal.         Speech: Speech normal.         Behavior: Behavior normal. Behavior is cooperative.         Thought Content: Thought content normal.         Cognition and Memory: Cognition and memory normal.         Judgment: Judgment normal.         A/P    Diagnoses and all orders for this visit:    1. Irritability  -     DULoxetine (CYMBALTA) 30 MG capsule; Take 2 capsules by mouth Daily.  Dispense: 90 capsule; Refill: 1  -     escitalopram (LEXAPRO) 10 MG tablet; Take 1 tablet by mouth Daily.  Dispense: 90 tablet; Refill: 1  Continue on lexapro 10 mg daily.  Continue on cymbalta 30 mg daily.  Call if symptoms worsen.         Plan of care reviewed with patient at the conclusion of today's visit. Education was provided regarding diagnosis, management and any prescribed or recommended OTC medications.  Patient verbalizes understanding of and agreement with management plan.    Dictated Utilizing Dragon Dictation     Please note that portions of this note were completed with a voice recognition program.     Part of this note may be an electronic  transcription/translation of spoken language to printed text using the Dragon Dictation System.    Return in about 9 months (around 2/5/2024) for Medicare Wellness.     Tracee Gaona PA-C

## 2023-07-17 PROBLEM — H02.834 DERMATOCHALASIS OF BOTH UPPER EYELIDS: Status: ACTIVE | Noted: 2023-07-17

## 2023-07-17 PROBLEM — H02.831 DERMATOCHALASIS OF BOTH UPPER EYELIDS: Status: ACTIVE | Noted: 2023-07-17

## 2023-07-17 PROBLEM — L82.1 SEBORRHEIC KERATOSES: Status: ACTIVE | Noted: 2023-07-17

## 2023-08-03 DIAGNOSIS — R45.4 IRRITABILITY: ICD-10-CM

## 2023-08-04 RX ORDER — DULOXETIN HYDROCHLORIDE 30 MG/1
CAPSULE, DELAYED RELEASE ORAL
Qty: 90 CAPSULE | Refills: 1 | OUTPATIENT
Start: 2023-08-04

## 2023-12-11 ENCOUNTER — LAB (OUTPATIENT)
Dept: LAB | Facility: HOSPITAL | Age: 68
End: 2023-12-11
Payer: MEDICARE

## 2023-12-11 ENCOUNTER — OFFICE VISIT (OUTPATIENT)
Dept: FAMILY MEDICINE CLINIC | Facility: CLINIC | Age: 68
End: 2023-12-11
Payer: MEDICARE

## 2023-12-11 VITALS
RESPIRATION RATE: 14 BRPM | SYSTOLIC BLOOD PRESSURE: 158 MMHG | HEIGHT: 60 IN | BODY MASS INDEX: 36.87 KG/M2 | HEART RATE: 80 BPM | WEIGHT: 187.8 LBS | OXYGEN SATURATION: 98 % | DIASTOLIC BLOOD PRESSURE: 98 MMHG

## 2023-12-11 DIAGNOSIS — I10 ESSENTIAL HYPERTENSION: ICD-10-CM

## 2023-12-11 DIAGNOSIS — Z91.148 NONCOMPLIANCE WITH MEDICATIONS: ICD-10-CM

## 2023-12-11 DIAGNOSIS — R63.4 WEIGHT LOSS, UNINTENTIONAL: ICD-10-CM

## 2023-12-11 DIAGNOSIS — F33.2 SEVERE EPISODE OF RECURRENT MAJOR DEPRESSIVE DISORDER, WITHOUT PSYCHOTIC FEATURES: ICD-10-CM

## 2023-12-11 DIAGNOSIS — Z23 IMMUNIZATION DUE: ICD-10-CM

## 2023-12-11 DIAGNOSIS — E11.65 UNCONTROLLED TYPE 2 DIABETES MELLITUS WITH HYPERGLYCEMIA: Primary | ICD-10-CM

## 2023-12-11 DIAGNOSIS — E11.65 UNCONTROLLED TYPE 2 DIABETES MELLITUS WITH HYPERGLYCEMIA: ICD-10-CM

## 2023-12-11 LAB
BILIRUB BLD-MCNC: NEGATIVE MG/DL
CLARITY, POC: ABNORMAL
COLOR UR: YELLOW
EXPIRATION DATE: ABNORMAL
EXPIRATION DATE: ABNORMAL
GLUCOSE UR STRIP-MCNC: ABNORMAL MG/DL
HBA1C MFR BLD: 11 % (ref 4.5–5.7)
KETONES UR QL: NEGATIVE
LEUKOCYTE EST, POC: NEGATIVE
Lab: ABNORMAL
Lab: ABNORMAL
NITRITE UR-MCNC: NEGATIVE MG/ML
PH UR: 5 [PH] (ref 5–8)
PROT UR STRIP-MCNC: ABNORMAL MG/DL
RBC # UR STRIP: NEGATIVE /UL
SP GR UR: 1.02 (ref 1–1.03)
UROBILINOGEN UR QL: NORMAL

## 2023-12-11 PROCEDURE — 80053 COMPREHEN METABOLIC PANEL: CPT

## 2023-12-11 PROCEDURE — 83690 ASSAY OF LIPASE: CPT

## 2023-12-11 RX ORDER — FLUOXETINE HYDROCHLORIDE 20 MG/1
CAPSULE ORAL
Qty: 46 CAPSULE | Refills: 0 | Status: SHIPPED | OUTPATIENT
Start: 2023-12-11

## 2023-12-11 NOTE — PROGRESS NOTES
Chief Complaint   Patient presents with    Follow-up     Follow up fron ED visit. Pt stated that she kept on falling, ED dx Hyperglycemia and syncope         Johnna Gonsalves is a 68 y.o. female who presents for Follow-up (Follow up fron ED visit. Pt stated that she kept on falling, ED dx Hyperglycemia and syncope)    Patient had 3 episodes of syncope and collapse in the last month.  She was seen at [facility] and admitted from 11/11 to 11/13.  She had cardiac evaluation which was reassuring.  CT head was unremarkable.  She has had unintentional weight loss over the last few months.  She denies any abdominal pain, nausea/vomiting, change in stools, breast lumps/masses.  She states she has fallen in love and thinks this is why she has lost weight.  Has lack of appetite.  She is in love with someone over in [facility].  She has not spoken to this person or seen him on a video call.  She has been giving him money.  Patient's son is concerned and recently had her evaluated at [facility] but they were unable to hold her.  She states she can't live if this person, Andrew, is not real.  She has been scammed in the past.    Patient admits to depression.  Taking lexapro and citalopram.  Has been on these for awhile.  No suicidal ideation today.    She admits she is not taking her medication as prescribed.  She has not taken her blood pressure medication today.  She is not taking her insulin regularly and she is not monitoring her glucose levels.    Past Medical History:   Diagnosis Date    Anemia     Arthritis     Carpal tunnel syndrome     Cataract     Chronic tension headaches     Diabetes     Diabetic neuropathy     Dizziness     Environmental allergies     Fibromyalgia     Fibromyalgia     Fibromyalgia, primary 1992    Fractures     Gall stones     GERD (gastroesophageal reflux disease) 1997    Heart attack     Hypertension     Irritable bowel syndrome 2000    Kidney infection     Migraine headache     Numbness and tingling      Ovarian cyst     Shingles     Sleep apnea     Type 2 diabetes mellitus        Past Surgical History:   Procedure Laterality Date    BIOPSY OF LEG      CATARACT EXTRACTION       SECTION      CHEST WALL BIOPSY      CHOLECYSTECTOMY      HYSTERECTOMY      OOPHORECTOMY         Family History   Problem Relation Age of Onset    Diabetes Mother     Dementia Mother     Heart disease Mother     Hypertension Mother     Hyperlipidemia Mother     Kidney disease Mother     Neuropathy Mother     Obesity Mother     Thyroid disease Mother     Heart attack Mother     Heart disease Father     Lung cancer Father         smoker    Heart attack Father     Cancer Father     Vision loss Father         Hystoplasmosis    Obesity Sister     Cancer Sister     Vision loss Sister         Born with eye problems. Born with left eye being weak    Breast cancer Maternal Aunt     Lung cancer Maternal Aunt         smoker    Colon cancer Neg Hx     Ovarian cancer Neg Hx        Social History     Socioeconomic History    Marital status:    Tobacco Use    Smoking status: Never     Passive exposure: Never    Smokeless tobacco: Never   Vaping Use    Vaping Use: Never used   Substance and Sexual Activity    Alcohol use: Never    Drug use: Never    Sexual activity: Not Currently     Partners: Male       Allergies   Allergen Reactions    Sulfonylureas Shortness Of Breath    Liraglutide GI Intolerance    Sulfa Antibiotics Swelling       ROS    Review of Systems   Constitutional:  Positive for fatigue and unexpected weight loss. Negative for chills, diaphoresis and fever.   HENT:  Negative for congestion, postnasal drip and rhinorrhea.    Respiratory:  Negative for cough, shortness of breath and wheezing.    Cardiovascular:  Negative for chest pain and leg swelling.   Gastrointestinal:  Negative for abdominal pain, blood in stool, constipation, diarrhea, nausea, vomiting and indigestion.   Genitourinary:  Negative for breast lump, dysuria and  "menstrual problem.   Musculoskeletal:  Negative for arthralgias and myalgias.   Skin:  Negative for rash.   Neurological:  Negative for dizziness, weakness and headache.   Psychiatric/Behavioral:  Positive for behavioral problems, depressed mood and stress. Negative for self-injury, sleep disturbance and suicidal ideas. The patient is nervous/anxious.        Vitals:    12/11/23 1420   BP: 158/98   BP Location: Left arm   Patient Position: Sitting   Cuff Size: Adult   Pulse: 80   Resp: 14   SpO2: 98%   Weight: 85.2 kg (187 lb 12.8 oz)   Height: 152.4 cm (60\")   PainSc: 0-No pain     Body mass index is 36.68 kg/m².    Current Outpatient Medications on File Prior to Visit   Medication Sig Dispense Refill    Alcohol Swabs (B-D SINGLE USE SWABS REGULAR) pads 1 pad Daily. 100 each 3    aspirin 81 MG chewable tablet Chew 1 tablet Daily.      atorvastatin (LIPITOR) 40 MG tablet Take 1 tablet by mouth Daily.      bisoprolol (ZEBeta) 10 MG tablet Take 1 tablet by mouth Daily. 90 tablet 3    Blood Glucose Calibration (True Metrix Level 1) Low solution 1 each by Other route As Needed (to test blood glucose device). 1 each 5    Blood Glucose Monitoring Suppl (True Focus Blood Glucose Meter) device 1 Device Continuous. 1 each 0    glucose blood test strip PRN 60 each 5    insulin aspart (NovoLOG FlexPen) 100 UNIT/ML solution pen-injector sc pen Inject 30 Units under the skin into the appropriate area as directed 3 (Three) Times a Day With Meals. 90 mL 1    Insulin Glargine, 2 Unit Dial, (Toujeo Max SoloStar) 300 UNIT/ML solution pen-injector injection Inject 160 Units under the skin into the appropriate area as directed Daily. 48 mL 3    isosorbide mononitrate (IMDUR) 30 MG 24 hr tablet Take 1 tablet by mouth Daily.      loratadine (Claritin) 10 MG tablet Take 1 tablet by mouth Daily. 90 tablet 1    losartan (COZAAR) 100 MG tablet Take 1 tablet by mouth Daily.      omeprazole (PrilOSEC) 20 MG capsule Take 1 capsule by mouth Every " Morning Before Breakfast. 90 capsule 1    rOPINIRole (Requip) 0.5 MG tablet Take 1 tablet by mouth Every Night. Take 1 hour before bedtime. 90 tablet 1    triamterene-hydrochlorothiazide (MAXZIDE-25) 37.5-25 MG per tablet Take 1 tablet by mouth once daily 90 tablet 0    TRUEplus Lancets 33G misc Use to check blood surgars daily 100 each 11    [DISCONTINUED] DULoxetine (CYMBALTA) 30 MG capsule Take 1 capsule by mouth Daily. 90 capsule 1    [DISCONTINUED] escitalopram (LEXAPRO) 10 MG tablet Take 1 tablet by mouth Daily. 90 tablet 1     No current facility-administered medications on file prior to visit.       Results for orders placed or performed in visit on 12/11/23   POC Glycosylated Hemoglobin (Hb A1C)    Specimen: Blood   Result Value Ref Range    Hemoglobin A1C 11.0 (A) 4.5 - 5.7 %    Lot Number [id number]     Expiration Date 07/02/2025    POC Urinalysis Dipstick, Automated    Specimen: Urine   Result Value Ref Range    Color Yellow Yellow, Straw, Dark Yellow, Angela    Clarity, UA Cloudy (A) Clear    Specific Gravity  1.025 1.005 - 1.030    pH, Urine 5.0 5.0 - 8.0    Leukocytes Negative Negative    Nitrite, UA Negative Negative    Protein, POC Trace (A) Negative mg/dL    Glucose, UA 3+ (A) Negative mg/dL    Ketones, UA Negative Negative    Urobilinogen, UA Normal Normal, 0.2 E.U./dL    Bilirubin Negative Negative    Blood, UA Negative Negative    Lot Number [id number]     Expiration Date 03/25/2024        PE    Physical Exam  Vitals reviewed.   Constitutional:       General: She is not in acute distress.     Appearance: Normal appearance. She is well-developed. She is obese. She is not ill-appearing or diaphoretic.   HENT:      Head: Normocephalic and atraumatic.   Eyes:      Extraocular Movements: Extraocular movements intact.      Conjunctiva/sclera: Conjunctivae normal.   Pulmonary:      Effort: No respiratory distress.   Musculoskeletal:         General: Normal range of motion.      Cervical back: Normal  range of motion.   Neurological:      General: No focal deficit present.      Mental Status: She is alert.   Psychiatric:         Attention and Perception: Attention normal. She is attentive.         Mood and Affect: Mood is anxious and depressed.         Speech: Speech normal.         Behavior: Behavior normal. Behavior is cooperative.         Thought Content: Thought content does not include suicidal ideation.         Judgment: Judgment normal.          A/P    Diagnoses and all orders for this visit:    1. Uncontrolled type 2 diabetes mellitus with hyperglycemia (Primary)  -     POC Glycosylated Hemoglobin (Hb A1C)  -     POC Urinalysis Dipstick, Automated  -     Comprehensive Metabolic Panel; Future  -     Lipase; Future  Previous hemoglobin AIC was 11.5%, hemoglobin AIC is 11% today.  Urinalysis shows glucose.  No ketones.  Patient is established with endocrinology.  Discussed that if she is non-compliant on medication that her diabetes will never be well-controlled.  Elevated glucose levels may be affecting appetite and causing weight loss.    2. Essential hypertension  Elevated.  Did not take medication today.    3. Noncompliance with medications    4. Severe episode of recurrent major depressive disorder, without psychotic features  -     FLUoxetine (PROzac) 20 MG capsule; Take 1 tablet daily for 2 weeks, then increase to 2 tablets daily.  Dispense: 46 capsule; Refill: 0  Stop lexapro and start prozac 20 mg daily.  In 14 days, stop cymbalta 30 mg and increase prozac to 40 mg daily.  Discussed that her online boyfriend may be a scam.  Avoid giving him any more money.  Request to speak with him on video to ensure that he is real.  Return in 1 month.  No suicidal ideation today.    5. Weight loss, unintentional  Has lost 43 lbs since July.  States that she has no appetite.  Denies any abdominal symptoms, breast lumps, abnormal menstruation.    Recent CT head is unremarkable.  Encouraged patient to eat 3 healthy  meals a day.    Call if she has any unusual symptoms.  May be related to uncontrolled diabetes vs. Depression.  Follow-up in 1 month.  If weight loss persists, would obtain CT chest and abdomen/pelvis.  Colonoscopy and mammogram up-to-date.    6. Immunization due  -     Fluzone High-Dose 65+yrs    I spent 40 minutes caring for Johnna on this date of service. This time includes time spent by me in the following activities: preparing for the visit, reviewing tests, obtaining and/or reviewing a separately obtained history, performing a medically appropriate examination and/or evaluation, counseling and educating the patient/family/caregiver, ordering medications, tests, or procedures, documenting information in the medical record, independently interpreting results and communicating that information with the patient/family/caregiver, and care coordination      Plan of care reviewed with patient at the conclusion of today's visit. Education was provided regarding diagnosis, management and any prescribed or recommended OTC medications.  Patient verbalizes understanding of and agreement with management plan.    Dictated Utilizing Dragon Dictation     Please note that portions of this note were completed with a voice recognition program.     Part of this note may be an electronic transcription/translation of spoken language to printed text using the Dragon Dictation System.    Return in about 4 weeks (around 1/8/2024) for Recheck, depression/HTN.     Tracee Gaona PA-C

## 2023-12-12 LAB
ALBUMIN SERPL-MCNC: 4.5 G/DL (ref 3.5–5.2)
ALBUMIN/GLOB SERPL: 1.4 G/DL
ALP SERPL-CCNC: 105 U/L (ref 39–117)
ALT SERPL W P-5'-P-CCNC: 21 U/L (ref 1–33)
ANION GAP SERPL CALCULATED.3IONS-SCNC: 11 MMOL/L (ref 5–15)
AST SERPL-CCNC: 20 U/L (ref 1–32)
BILIRUB SERPL-MCNC: 0.4 MG/DL (ref 0–1.2)
BUN SERPL-MCNC: 19 MG/DL (ref 8–23)
BUN/CREAT SERPL: 21.6 (ref 7–25)
CALCIUM SPEC-SCNC: 9.5 MG/DL (ref 8.6–10.5)
CHLORIDE SERPL-SCNC: 100 MMOL/L (ref 98–107)
CO2 SERPL-SCNC: 25 MMOL/L (ref 22–29)
CREAT SERPL-MCNC: 0.88 MG/DL (ref 0.57–1)
EGFRCR SERPLBLD CKD-EPI 2021: 72.6 ML/MIN/1.73
GLOBULIN UR ELPH-MCNC: 3.3 GM/DL
GLUCOSE SERPL-MCNC: 334 MG/DL (ref 65–99)
LIPASE SERPL-CCNC: 58 U/L (ref 13–60)
POTASSIUM SERPL-SCNC: 4.6 MMOL/L (ref 3.5–5.2)
PROT SERPL-MCNC: 7.8 G/DL (ref 6–8.5)
SODIUM SERPL-SCNC: 136 MMOL/L (ref 136–145)

## 2023-12-31 DIAGNOSIS — F33.2 SEVERE EPISODE OF RECURRENT MAJOR DEPRESSIVE DISORDER, WITHOUT PSYCHOTIC FEATURES: ICD-10-CM

## 2024-01-02 RX ORDER — FLUOXETINE HYDROCHLORIDE 20 MG/1
CAPSULE ORAL
Qty: 46 CAPSULE | Refills: 0 | Status: SHIPPED | OUTPATIENT
Start: 2024-01-02

## 2024-01-02 NOTE — TELEPHONE ENCOUNTER
Rx Refill Note  Requested Prescriptions     Pending Prescriptions Disp Refills    FLUoxetine (PROzac) 20 MG capsule [Pharmacy Med Name: FLUOXETINE HCL 20 MG CAPSULE] 46 capsule 0     Sig: TAKE 1 CAPSULE DAILY FOR 2 WEEKS THEN INCREASE TO TAKE 2 CAPSULES DAILY      Last office visit with prescribing clinician: 12/11/2023   Last telemedicine visit with prescribing clinician: Visit date not found   Next office visit with prescribing clinician: 1/15/2024                         Would you like a call back once the refill request has been completed: [] Yes [] No    If the office needs to give you a call back, can they leave a voicemail: [] Yes [] No    Nai Israel MA  01/02/24, 13:22 EST

## 2024-01-10 DIAGNOSIS — J30.2 SEASONAL ALLERGIES: ICD-10-CM

## 2024-01-10 RX ORDER — LORATADINE 10 MG/1
10 TABLET ORAL DAILY
Qty: 90 TABLET | Refills: 1 | Status: SHIPPED | OUTPATIENT
Start: 2024-01-10

## 2024-01-16 DIAGNOSIS — R45.4 IRRITABILITY: ICD-10-CM

## 2024-01-17 RX ORDER — DULOXETIN HYDROCHLORIDE 30 MG/1
60 CAPSULE, DELAYED RELEASE ORAL DAILY
Qty: 90 CAPSULE | Refills: 1 | OUTPATIENT
Start: 2024-01-17

## 2024-03-18 DIAGNOSIS — F33.2 SEVERE EPISODE OF RECURRENT MAJOR DEPRESSIVE DISORDER, WITHOUT PSYCHOTIC FEATURES: ICD-10-CM

## 2024-03-18 RX ORDER — FLUOXETINE HYDROCHLORIDE 40 MG/1
40 CAPSULE ORAL DAILY
Qty: 90 CAPSULE | Refills: 3 | Status: SHIPPED | OUTPATIENT
Start: 2024-03-18

## 2024-03-18 RX ORDER — FLUOXETINE HYDROCHLORIDE 20 MG/1
CAPSULE ORAL
Qty: 46 CAPSULE | Refills: 0 | OUTPATIENT
Start: 2024-03-18

## 2024-03-18 NOTE — TELEPHONE ENCOUNTER
Rx Refill Note  Requested Prescriptions     Pending Prescriptions Disp Refills    FLUoxetine (PROzac) 20 MG capsule [Pharmacy Med Name: FLUOXETINE HCL 20 MG CAPSULE] 46 capsule 0     Sig: TAKE 1 CAPSULE DAILY FOR 2 WEEKS THEN INCREASE TO TAKE 2 CAPSULES DAILY      Last office visit with prescribing clinician: 12/11/2023   Last telemedicine visit with prescribing clinician: Visit date not found   Next office visit with prescribing clinician: Visit date not found                         Would you like a call back once the refill request has been completed: [] Yes [] No    If the office needs to give you a call back, can they leave a voicemail: [] Yes [] No    Anika Chavis MA  03/18/24, 13:58 EDT

## 2024-03-19 DIAGNOSIS — R45.4 IRRITABILITY: ICD-10-CM

## 2024-03-19 RX ORDER — DULOXETIN HYDROCHLORIDE 30 MG/1
60 CAPSULE, DELAYED RELEASE ORAL DAILY
Qty: 90 CAPSULE | Refills: 1 | OUTPATIENT
Start: 2024-03-19

## 2024-05-24 ENCOUNTER — TELEPHONE (OUTPATIENT)
Dept: ENDOCRINOLOGY | Facility: CLINIC | Age: 69
End: 2024-05-24
Payer: MEDICARE

## 2024-05-24 NOTE — TELEPHONE ENCOUNTER
Representative called stating patient is a mutual patient. They were just calling to follow up on a form they faxed over to find out when it will be signed and sent.   Please Advise    [facility]  [phone]

## 2024-05-29 ENCOUNTER — TELEPHONE (OUTPATIENT)
Dept: ENDOCRINOLOGY | Facility: CLINIC | Age: 69
End: 2024-05-29
Payer: MEDICARE

## 2024-05-29 NOTE — TELEPHONE ENCOUNTER
[facility] received a fax that requires your attention. The document has been indexed to the patient’s chart for your review.      Documents Description: MEDICAL DOCUMENT REQUEST    Name of Sender: [facility]    Date Indexed: 05-.    Notes (if needed): INDEXED UNDER PATIENT CORRESPONDENCE.

## 2024-05-29 NOTE — TELEPHONE ENCOUNTER
Patients last appointment 06/28/2023.  Called and spoke with patient.  Advised that appointment needed.  Transferred to scheduling.

## 2024-09-17 ENCOUNTER — OFFICE VISIT (OUTPATIENT)
Age: 69
End: 2024-09-17
Payer: MEDICARE

## 2024-09-17 VITALS
HEIGHT: 60 IN | SYSTOLIC BLOOD PRESSURE: 150 MMHG | BODY MASS INDEX: 37.3 KG/M2 | DIASTOLIC BLOOD PRESSURE: 88 MMHG | HEART RATE: 83 BPM | WEIGHT: 190 LBS | OXYGEN SATURATION: 99 %

## 2024-09-17 DIAGNOSIS — E11.65 UNCONTROLLED TYPE 2 DIABETES MELLITUS WITH HYPERGLYCEMIA: Primary | ICD-10-CM

## 2024-09-17 LAB
EXPIRATION DATE: ABNORMAL
EXPIRATION DATE: ABNORMAL
GLUCOSE BLDC GLUCOMTR-MCNC: 450 MG/DL (ref 70–130)
HBA1C MFR BLD: 12.1 % (ref 4.5–5.7)
Lab: ABNORMAL
Lab: ABNORMAL

## 2024-09-17 PROCEDURE — 3077F SYST BP >= 140 MM HG: CPT | Performed by: INTERNAL MEDICINE

## 2024-09-17 PROCEDURE — 1159F MED LIST DOCD IN RCRD: CPT | Performed by: INTERNAL MEDICINE

## 2024-09-17 PROCEDURE — 3079F DIAST BP 80-89 MM HG: CPT | Performed by: INTERNAL MEDICINE

## 2024-09-17 PROCEDURE — 82947 ASSAY GLUCOSE BLOOD QUANT: CPT | Performed by: INTERNAL MEDICINE

## 2024-09-17 PROCEDURE — 1160F RVW MEDS BY RX/DR IN RCRD: CPT | Performed by: INTERNAL MEDICINE

## 2024-09-17 PROCEDURE — 36415 COLL VENOUS BLD VENIPUNCTURE: CPT | Performed by: INTERNAL MEDICINE

## 2024-09-17 PROCEDURE — 3046F HEMOGLOBIN A1C LEVEL >9.0%: CPT | Performed by: INTERNAL MEDICINE

## 2024-09-17 PROCEDURE — 99214 OFFICE O/P EST MOD 30 MIN: CPT | Performed by: INTERNAL MEDICINE

## 2024-09-17 PROCEDURE — 80053 COMPREHEN METABOLIC PANEL: CPT | Performed by: INTERNAL MEDICINE

## 2024-09-17 PROCEDURE — 83036 HEMOGLOBIN GLYCOSYLATED A1C: CPT | Performed by: INTERNAL MEDICINE

## 2024-09-17 RX ORDER — INSULIN ASPART 100 [IU]/ML
INJECTION, SOLUTION INTRAVENOUS; SUBCUTANEOUS
Qty: 30 ML | Refills: 12 | Status: SHIPPED | OUTPATIENT
Start: 2024-09-17

## 2024-09-17 RX ORDER — SUB-Q INSULIN DEVICE, 40 UNIT
1 EACH MISCELLANEOUS DAILY
Qty: 30 KIT | Refills: 5 | Status: SHIPPED | OUTPATIENT
Start: 2024-09-17 | End: 2024-09-19 | Stop reason: SDUPTHER

## 2024-09-18 LAB
ALBUMIN SERPL-MCNC: 4.1 G/DL (ref 3.5–5.2)
ALBUMIN/GLOB SERPL: 1.3 G/DL
ALP SERPL-CCNC: 103 U/L (ref 39–117)
ALT SERPL W P-5'-P-CCNC: 18 U/L (ref 1–33)
ANION GAP SERPL CALCULATED.3IONS-SCNC: 11.5 MMOL/L (ref 5–15)
AST SERPL-CCNC: 22 U/L (ref 1–32)
BILIRUB SERPL-MCNC: 0.4 MG/DL (ref 0–1.2)
BUN SERPL-MCNC: 13 MG/DL (ref 8–23)
BUN/CREAT SERPL: 16.5 (ref 7–25)
CALCIUM SPEC-SCNC: 9.5 MG/DL (ref 8.6–10.5)
CHLORIDE SERPL-SCNC: 98 MMOL/L (ref 98–107)
CO2 SERPL-SCNC: 26.5 MMOL/L (ref 22–29)
CREAT SERPL-MCNC: 0.79 MG/DL (ref 0.57–1)
EGFRCR SERPLBLD CKD-EPI 2021: 82.1 ML/MIN/1.73
GLOBULIN UR ELPH-MCNC: 3.1 GM/DL
GLUCOSE SERPL-MCNC: 372 MG/DL (ref 65–99)
POTASSIUM SERPL-SCNC: 5.8 MMOL/L (ref 3.5–5.2)
PROT SERPL-MCNC: 7.2 G/DL (ref 6–8.5)
SODIUM SERPL-SCNC: 136 MMOL/L (ref 136–145)

## 2024-09-19 RX ORDER — SUB-Q INSULIN DEVICE, 40 UNIT
1 EACH MISCELLANEOUS DAILY
Qty: 30 KIT | Refills: 5 | Status: SHIPPED | OUTPATIENT
Start: 2024-09-19

## 2024-09-20 ENCOUNTER — DOCUMENTATION (OUTPATIENT)
Dept: ENDOCRINOLOGY | Facility: CLINIC | Age: 69
End: 2024-09-20
Payer: MEDICARE

## 2024-09-25 ENCOUNTER — TELEPHONE (OUTPATIENT)
Dept: ENDOCRINOLOGY | Facility: CLINIC | Age: 69
End: 2024-09-25
Payer: MEDICARE

## 2024-09-25 NOTE — TELEPHONE ENCOUNTER
Pt returned call, states she is doing well on the Vgo. Fasting BG today was 169, reports BG levels have remained <300 since starting. She is very pleased with the progress. Pt was not able to reports PPBG levels specifically.   Discussed advancing to using sensor to make BG data collection easier and she is interested. Will OK with MD before sending order to DME.   Pt states she has been trying to make healthier food choices as well and is feeling motivated.

## 2024-10-07 ENCOUNTER — DOCUMENTATION (OUTPATIENT)
Dept: ENDOCRINOLOGY | Facility: CLINIC | Age: 69
End: 2024-10-07
Payer: MEDICARE

## 2024-10-07 ENCOUNTER — TELEPHONE (OUTPATIENT)
Dept: ENDOCRINOLOGY | Facility: CLINIC | Age: 69
End: 2024-10-07
Payer: MEDICARE

## 2024-10-07 NOTE — PROGRESS NOTES
Pt came by office for assistance with Brianna 3 today. Pt is using phone kraig and is connected for sharing with [facility].  Pt states she is doing well on Vgo - no concerns. Provided recommendations for adhesive removal.   Will check on glucose values in the next few days to see how she is doing. Encouraged pt to call with questions/concerns.

## 2024-10-08 ENCOUNTER — TELEPHONE (OUTPATIENT)
Dept: ENDOCRINOLOGY | Facility: CLINIC | Age: 69
End: 2024-10-08
Payer: MEDICARE

## 2024-10-08 NOTE — TELEPHONE ENCOUNTER
Returned call, discussed result so far. Fasting BG w/in goal. Postprandial hyperglycemia noted - may want to increase insulin at meals. Advised pt that we will collect data for the next week before we make any changes. Will touch base next week.

## 2024-10-08 NOTE — TELEPHONE ENCOUNTER
Patient called wanting to speak with Claudette, she wanted her to know she is loving her CGM and wants her to look over her readings to see how she is doing then call her back. Advised I would send a message back and she would reach out to her once she is free. She voiced understanding.

## 2024-10-11 ENCOUNTER — TELEPHONE (OUTPATIENT)
Dept: ENDOCRINOLOGY | Facility: CLINIC | Age: 69
End: 2024-10-11
Payer: MEDICARE

## 2024-10-15 ENCOUNTER — TELEPHONE (OUTPATIENT)
Dept: ENDOCRINOLOGY | Facility: CLINIC | Age: 69
End: 2024-10-15
Payer: MEDICARE

## 2025-05-23 DIAGNOSIS — I10 ESSENTIAL HYPERTENSION: ICD-10-CM

## 2025-05-23 RX ORDER — GLUCOSAM/CHON-MSM1/C/MANG/BOSW 500-416.6
TABLET ORAL
Qty: 100 EACH | Refills: 11 | Status: SHIPPED | OUTPATIENT
Start: 2025-05-23

## 2025-05-23 RX ORDER — BLOOD-GLUCOSE CONTROL, LOW
1 EACH MISCELLANEOUS AS NEEDED
Qty: 1 EACH | Refills: 5 | Status: SHIPPED | OUTPATIENT
Start: 2025-05-23

## 2025-05-23 RX ORDER — ISOPROPYL ALCOHOL 0.75 G/1
1 SWAB TOPICAL DAILY
Qty: 100 EACH | Refills: 3 | Status: SHIPPED | OUTPATIENT
Start: 2025-05-23

## 2025-05-23 RX ORDER — INSULIN ASPART 100 [IU]/ML
INJECTION, SOLUTION INTRAVENOUS; SUBCUTANEOUS
Qty: 30 ML | Refills: 12 | Status: SHIPPED | OUTPATIENT
Start: 2025-05-23

## 2025-05-23 RX ORDER — SUB-Q INSULIN DEVICE, 40 UNIT
1 EACH MISCELLANEOUS DAILY
Qty: 30 KIT | Refills: 5 | Status: SHIPPED | OUTPATIENT
Start: 2025-05-23

## 2025-05-23 NOTE — TELEPHONE ENCOUNTER
PT CALLED REQUESTING WE SEND ALL OF HER Rxs IN TO [facility]. SHE STATED THE ONLY WAY SHE CAN COMMUNICATE WITH US IS THROUGH [employer].

## 2025-05-23 NOTE — TELEPHONE ENCOUNTER
Caller: Johnna Gonsalves    Relationship: Self    Best call back number:     PATIENT CURRENTLY DOESN'T HAVE A TELEPHONE     PATIENT REQUESTED CONTACT TAKE PLACE VIA [employer] ACCOUNT    Requested Prescriptions:   Requested Prescriptions     Pending Prescriptions Disp Refills    bisoprolol (ZEBeta) 10 MG tablet 90 tablet 3     Sig: Take 1 tablet by mouth Daily.    triamterene-hydrochlorothiazide (MAXZIDE-25) 37.5-25 MG per tablet 90 tablet 0     Sig: Take 1 tablet by mouth Daily.      Pharmacy where request should be sent:     [facility] MAIL DELIVERY - [facility] [address] - [phone]  - [phone] FX     Last office visit with prescribing clinician: 12/11/2023   Last telemedicine visit with prescribing clinician: Visit date not found   Next office visit with prescribing clinician: Visit date not found     Additional details provided by patient:     PATIENT REQUESTED MEDICATIONS BE FORWARDED FOR REFILL TO [facility] INCLUDED ABOVE FOR ALL CURRENT REFILL PRESCRIPTIONS AS WELL AS ANY FUTURE PRESCRIPTIONS    PATIENT STATED SHE IS VERY CLOSE TO RUNNING OUT, BUT DOESN'T RECALL IF SHE HAS (3) DAY SUPPLY LEFT    PATIENT ALSO STATED SHE HAS NOT BEEN TAKING MEDICATIONS AS DIRECTED SINCE SHE IS AFRAID TO RUN OUT PRIOR TO ANY REFILLS    Does the patient have less than a 3 day supply:  [] Yes  [] No    Would you like a call back once the refill request has been completed: [] Yes [] No    If the office needs to give you a call back, can they leave a voicemail: [] Yes [] No    Jerry Garces Rep   05/23/25 11:09 EDT

## 2025-05-23 NOTE — TELEPHONE ENCOUNTER
Rx Refill Note  Requested Prescriptions     Pending Prescriptions Disp Refills    TRUEplus Lancets 33G misc 100 each 11     Sig: Use to check blood surgars daily    Insulin Disposable Pump (V-Go 30) 30 UNIT/24HR kit 30 kit 5     Sig: Use 1 each Daily.    Insulin Aspart (NovoLOG) 100 UNIT/ML injection 30 mL 12     Si units per day via vgo    glucose blood test strip 60 each 5     Sig: PRN    Blood Glucose Monitoring Suppl (True Focus Blood Glucose Meter) device 1 each 0     Sig: Use 1 Device Continuous.    Blood Glucose Calibration (True Metrix Level 1) Low solution 1 each 5     Si each by Other route As Needed (to test blood glucose device).    Alcohol Swabs (B-D SINGLE USE SWABS REGULAR) pads 100 each 3     Sig: Use 1 pad Daily.      Last office visit with prescribing clinician: 2024      Next office visit with prescribing clinician: 10/6/2025       Roma Thompson MA  25, 11:33 EDT

## 2025-05-27 RX ORDER — BISOPROLOL FUMARATE 10 MG/1
10 TABLET, FILM COATED ORAL DAILY
Qty: 90 TABLET | Refills: 3 | OUTPATIENT
Start: 2025-05-27

## 2025-05-27 RX ORDER — TRIAMTERENE AND HYDROCHLOROTHIAZIDE 37.5; 25 MG/1; MG/1
1 TABLET ORAL DAILY
Qty: 90 TABLET | Refills: 0 | OUTPATIENT
Start: 2025-05-27